# Patient Record
Sex: FEMALE | Race: WHITE | Employment: OTHER | URBAN - METROPOLITAN AREA
[De-identification: names, ages, dates, MRNs, and addresses within clinical notes are randomized per-mention and may not be internally consistent; named-entity substitution may affect disease eponyms.]

---

## 2018-08-16 ENCOUNTER — HOSPITAL ENCOUNTER (OUTPATIENT)
Dept: MAMMOGRAPHY | Age: 63
Discharge: HOME | End: 2018-08-16
Attending: INTERNAL MEDICINE | Admitting: INTERNAL MEDICINE

## 2018-08-16 DIAGNOSIS — Z12.39 BREAST CANCER SCREENING: ICD-10-CM

## 2020-09-30 ENCOUNTER — HOSPITAL ENCOUNTER (OUTPATIENT)
Dept: WOMENS IMAGING | Age: 65
Discharge: HOME | End: 2020-09-30
Payer: OTHER GOVERNMENT

## 2020-09-30 PROCEDURE — 77067 SCR MAMMO BI INCL CAD: CPT

## 2021-07-11 ENCOUNTER — APPOINTMENT (OUTPATIENT)
Dept: CT IMAGING | Age: 66
End: 2021-07-11
Payer: OTHER GOVERNMENT

## 2021-07-11 ENCOUNTER — HOSPITAL ENCOUNTER (EMERGENCY)
Age: 66
Discharge: HOME | End: 2021-07-11
Payer: OTHER GOVERNMENT

## 2021-07-11 ENCOUNTER — APPOINTMENT (OUTPATIENT)
Dept: GENERAL RADIOLOGY | Age: 66
End: 2021-07-11
Payer: OTHER GOVERNMENT

## 2021-07-11 VITALS
TEMPERATURE: 97 F | HEIGHT: 66 IN | HEART RATE: 100 BPM | OXYGEN SATURATION: 94 % | DIASTOLIC BLOOD PRESSURE: 82 MMHG | RESPIRATION RATE: 16 BRPM | SYSTOLIC BLOOD PRESSURE: 181 MMHG | BODY MASS INDEX: 45.8 KG/M2 | WEIGHT: 285 LBS
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
## 2021-07-11 DIAGNOSIS — S09.90XA INJURY OF HEAD, INITIAL ENCOUNTER: ICD-10-CM

## 2021-07-11 DIAGNOSIS — S01.81XA FACIAL LACERATION, INITIAL ENCOUNTER: ICD-10-CM

## 2021-07-11 DIAGNOSIS — M25.512 ACUTE PAIN OF LEFT SHOULDER: ICD-10-CM

## 2021-07-11 PROCEDURE — 73030 X-RAY EXAM OF SHOULDER: CPT

## 2021-07-11 PROCEDURE — 72125 CT NECK SPINE W/O DYE: CPT

## 2021-07-11 PROCEDURE — 12011 RPR F/E/E/N/L/M 2.5 CM/<: CPT

## 2021-07-11 PROCEDURE — 70450 CT HEAD/BRAIN W/O DYE: CPT

## 2021-07-11 PROCEDURE — 6370000000 HC RX 637 (ALT 250 FOR IP): Performed by: PHYSICIAN ASSISTANT

## 2021-07-11 PROCEDURE — 99284 EMERGENCY DEPT VISIT MOD MDM: CPT

## 2021-07-11 RX ORDER — ACETAMINOPHEN 500 MG
1000 TABLET ORAL ONCE
Status: COMPLETED | OUTPATIENT
Start: 2021-07-11 | End: 2021-07-11

## 2021-07-11 RX ORDER — B-COMPLEX WITH VITAMIN C
TABLET ORAL
COMMUNITY
Start: 2021-07-01

## 2021-07-11 RX ORDER — MELOXICAM 15 MG/1
TABLET ORAL
COMMUNITY
Start: 2021-07-01

## 2021-07-11 RX ORDER — LIDOCAINE 4 G/G
1 PATCH TOPICAL ONCE
Status: DISCONTINUED | OUTPATIENT
Start: 2021-07-11 | End: 2021-07-11 | Stop reason: HOSPADM

## 2021-07-11 RX ORDER — LIDOCAINE 50 MG/G
PATCH TOPICAL
COMMUNITY
Start: 2021-01-15

## 2021-07-11 RX ORDER — ATORVASTATIN CALCIUM 80 MG/1
TABLET, FILM COATED ORAL
COMMUNITY
Start: 2021-07-01

## 2021-07-11 RX ADMIN — ACETAMINOPHEN 1000 MG: 500 TABLET ORAL at 11:44

## 2021-07-11 ASSESSMENT — PAIN SCALES - GENERAL
PAINLEVEL_OUTOF10: 10
PAINLEVEL_OUTOF10: 10

## 2021-07-11 ASSESSMENT — PAIN DESCRIPTION - LOCATION: LOCATION: SHOULDER

## 2021-07-11 NOTE — ED NOTES
Pt ambulated short distance in ER to bathroom; gait steady, pt denies dizziness/ lightheadedness. Pt returned to room, remains with bed in low/locked position, side rails up x 2, call light in reach, family member bedside.      Kyrie Montgomery RN  07/11/21 2338

## 2021-07-11 NOTE — ED PROVIDER NOTES
[facility]        Pt Name: Zeeshan Lange  MRN: [id number]  [place] 1955  Date of evaluation: 7/11/2021  Provider: Reyes Ness PA-C  PCP: Inocencia Paul  Note Started: 11:52 AM EDT       WAQAS. I have evaluated this patient. My supervising physician was available for consultation. CHIEF COMPLAINT       Chief Complaint   Patient presents with    Fall     pt in by [facility] from [place]- states fall into pillar, no loc, denies dizziness- with lac to upper lip and pain to left shoulder, left neck. HISTORY OF PRESENT ILLNESS   (Location, Timing/Onset, Context/Setting, Quality, Duration, Modifying Factors, Severity, Associated Signs and Symptoms)  Note limiting factors. Chief Complaint: Fall, head injury and left shoulder pain    Zeeshan Lange is a 77 y.o. female who presents to the emergency department after she fell about an hour before presenting to the emergency department. She states she was walking at [employer] and then she states it felt like her legs just were not coming underneath her and she started stumbling forward falling forward hitting her head and her left shoulder. Has history of rotator cuff tear in her left shoulder and is actually scheduled for surgery at the [place] for this. She denies any other injury from the incident today. She denies loss of consciousness, dizziness, use of anticoagulants, nausea, vomiting, seizures, chest pain, shortness breath, abdominal pain, midline neck or back pain. Has some chronic knee pain and denies any change in this. Rates the pain a 10 out of 10. Does not want anything stronger than Tylenol at this time. Denies any other symptoms. Nursing Notes were all reviewed and agreed with or any disagreements were addressed in the HPI.     REVIEW OF SYSTEMS    (2-9 systems for level 4, 10 or more for level 5)     Review of Systems    Positives and Pertinent negatives as per HPI. Except as noted above in the ROS, all other systems were reviewed and negative. PAST MEDICAL HISTORY     Past Medical History:   Diagnosis Date    Arthritis     Hypertension          SURGICAL HISTORY     Past Surgical History:   Procedure Laterality Date    ACHILLES TENDON SURGERY      JOINT REPLACEMENT      bilateral partial knee         CURRENTMEDICATIONS       Discharge Medication List as of 7/11/2021  1:58 PM      CONTINUE these medications which have NOT CHANGED    Details   Calcium Carbonate-Vitamin D (OYSTER SHELL CALCIUM/D) 500-200 MG-UNIT TABS TAKE 1 TABLET BY MOUTH TWICE A DAYHistorical Med      metFORMIN (GLUCOPHAGE) 500 MG tablet TAKE ONE TABLET BY MOUTH TWICE A DAY WITH FOOD FOR DIABETESHistorical Med      meloxicam (MOBIC) 15 MG tablet TAKE ONE TABLET BY MOUTH ONCE DAILY WITH FOOD FOR PAIN/INFLAMMATIONHistorical Med      lidocaine (LIDODERM) 5 % APPLY 1 PATCH TO SKIN ONCE DAILY AS NEEDED MAY LEAVE ON UP TO 12 HOURS PER DAY (PRESS FIRMLY FOR 10-15 SECONDS FOR BEST ADHERENCE)Historical Med      vitamin D (CHOLECALCIFEROL) 25 MCG (1000 UT) TABS tablet TAKE TWO TABLETS BY MOUTH ONCE DAILY (VITAMIN D SUPPLEMENT)Historical Med      atorvastatin (LIPITOR) 80 MG tablet TAKE ONE TABLET BY MOUTH AT BEDTIME FOR CHOLESTEROLHistorical Med      ibuprofen-famotidine (DUEXIS) 800-26.6 MG TABS Take 1 tablet by mouth 2 times daily as neededHistorical Med      lisinopril-hydrochlorothiazide (PRINZIDE;ZESTORETIC) 20-25 MG per tablet Take 1 tablet by mouth dailyHistorical Med      naproxen (NAPROSYN) 500 MG tablet Take 1 tablet by mouth 2 times daily for 20 doses, Disp-20 tablet, R-0Print               ALLERGIES     Patient has no known allergies. FAMILYHISTORY     History reviewed. No pertinent family history.        SOCIAL HISTORY       Social History     Tobacco Use    Smoking status: Never Smoker   Substance Use Topics    Alcohol use: No    Drug use: No       SCREENINGS PHYSICAL EXAM    (up to 7 for level 4, 8 or more for level 5)     ED Triage Vitals [07/11/21 1102]   BP Temp Temp Source Pulse Resp SpO2 Height Weight   (!) 169/84 97 °F (36.1 °C) Infrared 100 16 93 % 5' 6\" (1.676 m) 285 lb (129.3 kg)       Physical Exam  Vitals and nursing note reviewed. Constitutional:       Appearance: She is well-developed. She is not diaphoretic. HENT:      Head:      Comments: There is a 1.6 cm laceration just below the nose over the philtrum. At no tenderness over the mandible or orbits. No entrapment of the eyes. No raccoon eyes or hernandez sign. No CSF rhinorrhea or septal hematoma. Right Ear: External ear normal.      Left Ear: External ear normal.      Nose: Nose normal.      Mouth/Throat:      Mouth: Mucous membranes are moist.   Eyes:      General:         Right eye: No discharge. Left eye: No discharge. Extraocular Movements: Extraocular movements intact. Pupils: Pupils are equal, round, and reactive to light. Cardiovascular:      Rate and Rhythm: Normal rate and regular rhythm. Pulses: Normal pulses. Heart sounds: No murmur heard. No friction rub. No gallop. Comments: 2+ radial pulse in left wrist.  Pulmonary:      Effort: Pulmonary effort is normal. No respiratory distress. Breath sounds: No stridor. No wheezing, rhonchi or rales. Abdominal:      General: Bowel sounds are normal. There is no distension. Palpations: Abdomen is soft. There is no mass. Tenderness: There is no abdominal tenderness. There is no guarding or rebound. Hernia: No hernia is present. Musculoskeletal:         General: Tenderness present. No swelling. Normal range of motion. Cervical back: Normal range of motion. Comments: Generalized tenderness to palpate over the left shoulder without any obvious deformity. Full range of motion of flexion extension of left elbow and wrist.  No joint warmth, erythema or rash.   No midline tenderness or step-off in the neck or back. Skin:     General: Skin is warm and dry. Findings: No erythema or rash. Neurological:      Mental Status: She is alert and oriented to person, place, and time. Cranial Nerves: No cranial nerve deficit. Psychiatric:         Behavior: Behavior normal.         DIAGNOSTIC RESULTS   LABS:    Labs Reviewed - No data to display    When ordered only abnormal lab results are displayed. All other labs were within normal range or not returned as of this dictation. EKG: When ordered, EKG's are interpreted by the Emergency Department Physician in the absence of a cardiologist.  Please see their note for interpretation of EKG. RADIOLOGY:   Non-plain film images such as CT, Ultrasound and MRI are read by the radiologist. Plain radiographic images are visualized and preliminarily interpreted by the ED Provider with the below findings:        Interpretation per the Radiologist below, if available at the time of this note:    XR SHOULDER LEFT (MIN 2 VIEWS)   Preliminary Result   No evidence of acute fracture. Follow-up examination recommended in 7-10 days   if clinically indicated. CT Cervical Spine WO Contrast   Final Result   No acute abnormality of the cervical spine. CT Head WO Contrast   Final Result   No acute intracranial abnormality. PROCEDURES   Unless otherwise noted below, none     Lac Repair  Performed by: Kaitlyn Adler PA-C  Authorized by: Kaitlyn Adler PA-C     Consent:     Consent obtained:  Verbal    Consent given by:  Patient    Risks discussed:  Infection, pain, poor cosmetic result, poor wound healing and need for additional repair  Anesthesia (see MAR for exact dosages): Anesthesia method:  Local infiltration    Local anesthetic:  Lidocaine 1% WITH epi  Laceration details:     Location:  Face    Facial location: philtrum.     Length (cm):  1.6  Repair type:     Repair type:  Simple  Pre-procedure details: Preparation:  Patient was prepped and draped in usual sterile fashion  Exploration:     Wound exploration: entire depth of wound probed and visualized    Treatment:     Area cleansed with:  Saline    Amount of cleaning:  Standard    Irrigation solution:  Sterile saline  Skin repair:     Repair method:  Sutures    Suture size:  6-0    Suture material:  Prolene    Suture technique:  Simple interrupted    Number of sutures:  4  Approximation:     Approximation:  Close  Post-procedure details:     Patient tolerance of procedure: Tolerated well, no immediate complications        CRITICAL CARE TIME   N/A    CONSULTS:  None      EMERGENCY DEPARTMENT COURSE and DIFFERENTIAL DIAGNOSIS/MDM:   Vitals:    Vitals:    07/11/21 1102 07/11/21 1130 07/11/21 1145 07/11/21 1200   BP: (!) 169/84 (!) 158/69 (!) 173/76 (!) 181/82   Pulse: 100      Resp: 16      Temp: 97 °F (36.1 °C)      TempSrc: Infrared      SpO2: 93% 94% 94% 94%   Weight: 285 lb (129.3 kg)      Height: 5' 6\" (1.676 m)          Patient was given the following medications:  Medications   lidocaine 4 % external patch 1 patch (1 patch Transdermal Patch Applied 7/11/21 1144)   lidocaine-EPINEPHrine 1 percent-1:[id number] injection (has no administration in time range)   acetaminophen (TYLENOL) tablet 1,000 mg (1,000 mg Oral Given 7/11/21 1144)           Patient presented after a fall. She has been able to ambulate here. States she just lost her balance falling forward. Mechanical fall. CT imaging is unremarkable. X-ray imaging of her left shoulder unremarkable. She already has follow-up with orthopedics at the [facility] for a rotator cuff tear in her left shoulder. She is distally neurovascular intact. Laceration of her face was repaired with 4 simple sutures was educated to have this removed in 1 week. Low suspicion for CVA, TIA, skull fracture, subdural hematoma, epidural hematoma, vertebral fracture or other emergent etiology.   Patient understood instructions at

## 2021-07-11 NOTE — ED NOTES
Bed: 21  Expected date:   Expected time:   Means of arrival:   Comments:  Medic 90-fall     Pierre Burnett RN  07/11/21 7493

## 2022-06-27 ENCOUNTER — HOSPITAL ENCOUNTER (OUTPATIENT)
Dept: WOMENS IMAGING | Age: 67
Discharge: HOME | End: 2022-06-27
Payer: OTHER GOVERNMENT

## 2022-06-27 VITALS — WEIGHT: 273 LBS | HEIGHT: 67 IN | BODY MASS INDEX: 42.85 KG/M2

## 2022-06-27 DIAGNOSIS — Z12.31 VISIT FOR SCREENING MAMMOGRAM: ICD-10-CM

## 2022-06-27 PROCEDURE — 77067 SCR MAMMO BI INCL CAD: CPT

## 2023-03-01 ENCOUNTER — HOSPITAL ENCOUNTER (INPATIENT)
Age: 68
LOS: 2 days | Discharge: HOME | End: 2023-03-03
Attending: EMERGENCY MEDICINE | Admitting: INTERNAL MEDICINE
Payer: OTHER GOVERNMENT

## 2023-03-01 ENCOUNTER — APPOINTMENT (OUTPATIENT)
Dept: GENERAL RADIOLOGY | Age: 68
End: 2023-03-01
Payer: OTHER GOVERNMENT

## 2023-03-01 DIAGNOSIS — L02.212 BACK ABSCESS: Primary | ICD-10-CM

## 2023-03-01 DIAGNOSIS — L03.312 CELLULITIS OF BACK: ICD-10-CM

## 2023-03-01 DIAGNOSIS — I48.91 NEW ONSET ATRIAL FIBRILLATION (HCC): ICD-10-CM

## 2023-03-01 DIAGNOSIS — R55 NEAR SYNCOPE: ICD-10-CM

## 2023-03-01 PROBLEM — I95.81 HYPOTENSION AFTER PROCEDURE: Status: ACTIVE | Noted: 2023-03-01

## 2023-03-01 PROBLEM — E78.2 MIXED HYPERLIPIDEMIA: Status: ACTIVE | Noted: 2023-03-01

## 2023-03-01 PROBLEM — E66.01 MORBID OBESITY DUE TO EXCESS CALORIES (HCC): Status: ACTIVE | Noted: 2023-03-01

## 2023-03-01 PROBLEM — D72.825 BANDEMIA: Status: ACTIVE | Noted: 2023-03-01

## 2023-03-01 LAB
A/G RATIO: 1.1 (ref 1.1–2.2)
ALBUMIN SERPL-MCNC: 3.8 G/DL (ref 3.4–5)
ALP BLD-CCNC: 98 U/L (ref 40–129)
ALT SERPL-CCNC: 10 U/L (ref 10–40)
ANION GAP SERPL CALCULATED.3IONS-SCNC: 11 MMOL/L (ref 3–16)
APTT: 26.5 SEC (ref 23–34.3)
AST SERPL-CCNC: 13 U/L (ref 15–37)
BASOPHILS ABSOLUTE: 0.1 K/UL (ref 0–0.2)
BASOPHILS RELATIVE PERCENT: 0.5 %
BILIRUB SERPL-MCNC: 0.6 MG/DL (ref 0–1)
BUN BLDV-MCNC: 30 MG/DL (ref 7–20)
C-REACTIVE PROTEIN: 31.2 MG/L (ref 0–5.1)
CALCIUM SERPL-MCNC: 10 MG/DL (ref 8.3–10.6)
CHLORIDE BLD-SCNC: 103 MMOL/L (ref 99–110)
CO2: 25 MMOL/L (ref 21–32)
CREAT SERPL-MCNC: 1 MG/DL (ref 0.6–1.2)
EKG ATRIAL RATE: 88 BPM
EKG DIAGNOSIS: NORMAL
EKG Q-T INTERVAL: 370 MS
EKG QRS DURATION: 80 MS
EKG QTC CALCULATION (BAZETT): 421 MS
EKG R AXIS: -9 DEGREES
EKG T AXIS: 28 DEGREES
EKG VENTRICULAR RATE: 78 BPM
EOSINOPHILS ABSOLUTE: 0.1 K/UL (ref 0–0.6)
EOSINOPHILS RELATIVE PERCENT: 1 %
FERRITIN: 170.5 NG/ML (ref 15–150)
GFR SERPL CREATININE-BSD FRML MDRD: >60 ML/MIN/{1.73_M2}
GLUCOSE BLD-MCNC: 114 MG/DL (ref 70–99)
GLUCOSE BLD-MCNC: 127 MG/DL (ref 70–99)
GLUCOSE BLD-MCNC: 159 MG/DL (ref 70–99)
GLUCOSE BLD-MCNC: 164 MG/DL (ref 70–99)
GLUCOSE BLD-MCNC: 169 MG/DL (ref 70–99)
GLUCOSE BLD-MCNC: 169 MG/DL (ref 70–99)
HCT VFR BLD CALC: 35.4 % (ref 36–48)
HEMOGLOBIN: 11.6 G/DL (ref 12–16)
INR BLD: 1.06 (ref 0.87–1.14)
IRON SATURATION: 10 % (ref 15–50)
IRON: 23 UG/DL (ref 37–145)
LYMPHOCYTES ABSOLUTE: 2 K/UL (ref 1–5.1)
LYMPHOCYTES RELATIVE PERCENT: 16.3 %
MCH RBC QN AUTO: 28.8 PG (ref 26–34)
MCHC RBC AUTO-ENTMCNC: 32.9 G/DL (ref 31–36)
MCV RBC AUTO: 87.5 FL (ref 80–100)
MONOCYTES ABSOLUTE: 0.8 K/UL (ref 0–1.3)
MONOCYTES RELATIVE PERCENT: 6.6 %
NEUTROPHILS ABSOLUTE: 9.4 K/UL (ref 1.7–7.7)
NEUTROPHILS RELATIVE PERCENT: 75.6 %
PDW BLD-RTO: 15.7 % (ref 12.4–15.4)
PERFORMED ON: ABNORMAL
PLATELET # BLD: 262 K/UL (ref 135–450)
PMV BLD AUTO: 10 FL (ref 5–10.5)
POTASSIUM REFLEX MAGNESIUM: 4.5 MMOL/L (ref 3.5–5.1)
PRO-BNP: 330 PG/ML (ref 0–124)
PROTHROMBIN TIME: 13.7 SEC (ref 11.7–14.5)
RBC # BLD: 4.04 M/UL (ref 4–5.2)
SEDIMENTATION RATE, ERYTHROCYTE: 53 MM/HR (ref 0–30)
SODIUM BLD-SCNC: 139 MMOL/L (ref 136–145)
TOTAL IRON BINDING CAPACITY: 238 UG/DL (ref 260–445)
TOTAL PROTEIN: 7.3 G/DL (ref 6.4–8.2)
TROPONIN: <0.01 NG/ML
WBC # BLD: 12.5 K/UL (ref 4–11)

## 2023-03-01 PROCEDURE — APPNB30 APP NON BILLABLE TIME 0-30 MINS: Performed by: NURSE PRACTITIONER

## 2023-03-01 PROCEDURE — 6360000002 HC RX W HCPCS: Performed by: INTERNAL MEDICINE

## 2023-03-01 PROCEDURE — 87081 CULTURE SCREEN ONLY: CPT

## 2023-03-01 PROCEDURE — 96374 THER/PROPH/DIAG INJ IV PUSH: CPT

## 2023-03-01 PROCEDURE — 85730 THROMBOPLASTIN TIME PARTIAL: CPT

## 2023-03-01 PROCEDURE — 1200000000 HC SEMI PRIVATE

## 2023-03-01 PROCEDURE — 6370000000 HC RX 637 (ALT 250 FOR IP): Performed by: INTERNAL MEDICINE

## 2023-03-01 PROCEDURE — 80053 COMPREHEN METABOLIC PANEL: CPT

## 2023-03-01 PROCEDURE — 2580000003 HC RX 258: Performed by: INTERNAL MEDICINE

## 2023-03-01 PROCEDURE — 84484 ASSAY OF TROPONIN QUANT: CPT

## 2023-03-01 PROCEDURE — 71045 X-RAY EXAM CHEST 1 VIEW: CPT

## 2023-03-01 PROCEDURE — 0J970ZZ DRAINAGE OF BACK SUBCUTANEOUS TISSUE AND FASCIA, OPEN APPROACH: ICD-10-PCS | Performed by: SURGERY

## 2023-03-01 PROCEDURE — 82728 ASSAY OF FERRITIN: CPT

## 2023-03-01 PROCEDURE — 83880 ASSAY OF NATRIURETIC PEPTIDE: CPT

## 2023-03-01 PROCEDURE — 83550 IRON BINDING TEST: CPT

## 2023-03-01 PROCEDURE — 2580000003 HC RX 258: Performed by: EMERGENCY MEDICINE

## 2023-03-01 PROCEDURE — 0H96XZZ DRAINAGE OF BACK SKIN, EXTERNAL APPROACH: ICD-10-PCS | Performed by: SURGERY

## 2023-03-01 PROCEDURE — 6360000002 HC RX W HCPCS: Performed by: EMERGENCY MEDICINE

## 2023-03-01 PROCEDURE — 99285 EMERGENCY DEPT VISIT HI MDM: CPT

## 2023-03-01 PROCEDURE — 85652 RBC SED RATE AUTOMATED: CPT

## 2023-03-01 PROCEDURE — 10060 I&D ABSCESS SIMPLE/SINGLE: CPT

## 2023-03-01 PROCEDURE — 83036 HEMOGLOBIN GLYCOSYLATED A1C: CPT

## 2023-03-01 PROCEDURE — 85025 COMPLETE CBC W/AUTO DIFF WBC: CPT

## 2023-03-01 PROCEDURE — 93005 ELECTROCARDIOGRAM TRACING: CPT | Performed by: EMERGENCY MEDICINE

## 2023-03-01 PROCEDURE — 6370000000 HC RX 637 (ALT 250 FOR IP): Performed by: EMERGENCY MEDICINE

## 2023-03-01 PROCEDURE — 2500000003 HC RX 250 WO HCPCS: Performed by: SURGERY

## 2023-03-01 PROCEDURE — 36415 COLL VENOUS BLD VENIPUNCTURE: CPT

## 2023-03-01 PROCEDURE — 87040 BLOOD CULTURE FOR BACTERIA: CPT

## 2023-03-01 PROCEDURE — 86140 C-REACTIVE PROTEIN: CPT

## 2023-03-01 PROCEDURE — 85610 PROTHROMBIN TIME: CPT

## 2023-03-01 PROCEDURE — 83540 ASSAY OF IRON: CPT

## 2023-03-01 RX ORDER — SULFAMETHOXAZOLE AND TRIMETHOPRIM 800; 160 MG/1; MG/1
1 TABLET ORAL ONCE
Status: COMPLETED | OUTPATIENT
Start: 2023-03-01 | End: 2023-03-01

## 2023-03-01 RX ORDER — ATORVASTATIN CALCIUM 40 MG/1
40 TABLET, FILM COATED ORAL NIGHTLY
Status: DISCONTINUED | OUTPATIENT
Start: 2023-03-01 | End: 2023-03-03 | Stop reason: HOSPADM

## 2023-03-01 RX ORDER — HYDROCODONE BITARTRATE AND ACETAMINOPHEN 5; 325 MG/1; MG/1
1 TABLET ORAL EVERY 6 HOURS PRN
Qty: 16 TABLET | Refills: 0 | Status: SHIPPED | OUTPATIENT
Start: 2023-03-01 | End: 2023-03-05

## 2023-03-01 RX ORDER — ONDANSETRON 2 MG/ML
4 INJECTION INTRAMUSCULAR; INTRAVENOUS EVERY 6 HOURS PRN
Status: DISCONTINUED | OUTPATIENT
Start: 2023-03-01 | End: 2023-03-03 | Stop reason: HOSPADM

## 2023-03-01 RX ORDER — LIDOCAINE HYDROCHLORIDE AND EPINEPHRINE 10; 10 MG/ML; UG/ML
20 INJECTION, SOLUTION INFILTRATION; PERINEURAL ONCE
Status: DISCONTINUED | OUTPATIENT
Start: 2023-03-01 | End: 2023-03-01 | Stop reason: CLARIF

## 2023-03-01 RX ORDER — ONDANSETRON 4 MG/1
4 TABLET, ORALLY DISINTEGRATING ORAL EVERY 8 HOURS PRN
Status: DISCONTINUED | OUTPATIENT
Start: 2023-03-01 | End: 2023-03-03 | Stop reason: HOSPADM

## 2023-03-01 RX ORDER — LISINOPRIL 20 MG/1
20 TABLET ORAL DAILY
Status: DISCONTINUED | OUTPATIENT
Start: 2023-03-01 | End: 2023-03-03 | Stop reason: HOSPADM

## 2023-03-01 RX ORDER — SODIUM CHLORIDE 0.9 % (FLUSH) 0.9 %
5-40 SYRINGE (ML) INJECTION PRN
Status: DISCONTINUED | OUTPATIENT
Start: 2023-03-01 | End: 2023-03-03 | Stop reason: HOSPADM

## 2023-03-01 RX ORDER — LISINOPRIL AND HYDROCHLOROTHIAZIDE 25; 20 MG/1; MG/1
1 TABLET ORAL DAILY
Status: DISCONTINUED | OUTPATIENT
Start: 2023-03-01 | End: 2023-03-01 | Stop reason: RX

## 2023-03-01 RX ORDER — SODIUM CHLORIDE 9 MG/ML
INJECTION, SOLUTION INTRAVENOUS PRN
Status: DISCONTINUED | OUTPATIENT
Start: 2023-03-01 | End: 2023-03-03 | Stop reason: HOSPADM

## 2023-03-01 RX ORDER — MELOXICAM 7.5 MG/1
15 TABLET ORAL DAILY
Status: DISCONTINUED | OUTPATIENT
Start: 2023-03-01 | End: 2023-03-01

## 2023-03-01 RX ORDER — LIDOCAINE HYDROCHLORIDE AND EPINEPHRINE BITARTRATE 20; .01 MG/ML; MG/ML
20 INJECTION, SOLUTION SUBCUTANEOUS ONCE
Status: DISCONTINUED | OUTPATIENT
Start: 2023-03-01 | End: 2023-03-01 | Stop reason: SDUPTHER

## 2023-03-01 RX ORDER — HYDROCHLOROTHIAZIDE 25 MG/1
25 TABLET ORAL DAILY
Status: DISCONTINUED | OUTPATIENT
Start: 2023-03-01 | End: 2023-03-02

## 2023-03-01 RX ORDER — INSULIN LISPRO 100 [IU]/ML
0-4 INJECTION, SOLUTION INTRAVENOUS; SUBCUTANEOUS NIGHTLY
Status: DISCONTINUED | OUTPATIENT
Start: 2023-03-01 | End: 2023-03-03 | Stop reason: HOSPADM

## 2023-03-01 RX ORDER — SODIUM CHLORIDE 9 MG/ML
INJECTION, SOLUTION INTRAVENOUS CONTINUOUS
Status: DISCONTINUED | OUTPATIENT
Start: 2023-03-01 | End: 2023-03-01 | Stop reason: ALTCHOICE

## 2023-03-01 RX ORDER — ENOXAPARIN SODIUM 100 MG/ML
30 INJECTION SUBCUTANEOUS 2 TIMES DAILY
Status: DISCONTINUED | OUTPATIENT
Start: 2023-03-01 | End: 2023-03-01

## 2023-03-01 RX ORDER — 0.9 % SODIUM CHLORIDE 0.9 %
1000 INTRAVENOUS SOLUTION INTRAVENOUS ONCE
Status: COMPLETED | OUTPATIENT
Start: 2023-03-01 | End: 2023-03-01

## 2023-03-01 RX ORDER — LIDOCAINE HYDROCHLORIDE AND EPINEPHRINE BITARTRATE 20; .01 MG/ML; MG/ML
20 INJECTION, SOLUTION SUBCUTANEOUS ONCE
Status: COMPLETED | OUTPATIENT
Start: 2023-03-01 | End: 2023-03-01

## 2023-03-01 RX ORDER — CEPHALEXIN 500 MG/1
500 CAPSULE ORAL 4 TIMES DAILY
Qty: 40 CAPSULE | Refills: 0 | Status: SHIPPED
Start: 2023-03-01 | End: 2023-03-03 | Stop reason: HOSPADM

## 2023-03-01 RX ORDER — ACETAMINOPHEN 650 MG/1
650 SUPPOSITORY RECTAL EVERY 6 HOURS PRN
Status: DISCONTINUED | OUTPATIENT
Start: 2023-03-01 | End: 2023-03-03 | Stop reason: HOSPADM

## 2023-03-01 RX ORDER — CEPHALEXIN 250 MG/1
500 CAPSULE ORAL ONCE
Status: COMPLETED | OUTPATIENT
Start: 2023-03-01 | End: 2023-03-01

## 2023-03-01 RX ORDER — LIDOCAINE HYDROCHLORIDE AND EPINEPHRINE BITARTRATE 20; .01 MG/ML; MG/ML
INJECTION, SOLUTION SUBCUTANEOUS
Status: DISPENSED
Start: 2023-03-01 | End: 2023-03-01

## 2023-03-01 RX ORDER — POLYETHYLENE GLYCOL 3350 17 G/17G
17 POWDER, FOR SOLUTION ORAL DAILY PRN
Status: DISCONTINUED | OUTPATIENT
Start: 2023-03-01 | End: 2023-03-03 | Stop reason: HOSPADM

## 2023-03-01 RX ORDER — ACETAMINOPHEN 500 MG
1000 TABLET ORAL EVERY 6 HOURS PRN
COMMUNITY

## 2023-03-01 RX ORDER — ONDANSETRON 2 MG/ML
4 INJECTION INTRAMUSCULAR; INTRAVENOUS
Status: DISCONTINUED | OUTPATIENT
Start: 2023-03-01 | End: 2023-03-01 | Stop reason: HOSPADM

## 2023-03-01 RX ORDER — LINEZOLID 600 MG/1
600 TABLET, FILM COATED ORAL EVERY 12 HOURS SCHEDULED
Status: DISCONTINUED | OUTPATIENT
Start: 2023-03-01 | End: 2023-03-03 | Stop reason: HOSPADM

## 2023-03-01 RX ORDER — DEXTROSE MONOHYDRATE 100 MG/ML
INJECTION, SOLUTION INTRAVENOUS CONTINUOUS PRN
Status: DISCONTINUED | OUTPATIENT
Start: 2023-03-01 | End: 2023-03-03 | Stop reason: HOSPADM

## 2023-03-01 RX ORDER — SODIUM CHLORIDE 0.9 % (FLUSH) 0.9 %
5-40 SYRINGE (ML) INJECTION EVERY 12 HOURS SCHEDULED
Status: DISCONTINUED | OUTPATIENT
Start: 2023-03-01 | End: 2023-03-03 | Stop reason: HOSPADM

## 2023-03-01 RX ORDER — HYDROCODONE BITARTRATE AND ACETAMINOPHEN 5; 325 MG/1; MG/1
2 TABLET ORAL ONCE
Status: COMPLETED | OUTPATIENT
Start: 2023-03-01 | End: 2023-03-01

## 2023-03-01 RX ORDER — ACETAMINOPHEN 325 MG/1
650 TABLET ORAL EVERY 6 HOURS PRN
Status: DISCONTINUED | OUTPATIENT
Start: 2023-03-01 | End: 2023-03-03 | Stop reason: HOSPADM

## 2023-03-01 RX ORDER — INSULIN LISPRO 100 [IU]/ML
0-4 INJECTION, SOLUTION INTRAVENOUS; SUBCUTANEOUS
Status: DISCONTINUED | OUTPATIENT
Start: 2023-03-01 | End: 2023-03-03 | Stop reason: HOSPADM

## 2023-03-01 RX ORDER — SULFAMETHOXAZOLE AND TRIMETHOPRIM 800; 160 MG/1; MG/1
1 TABLET ORAL 2 TIMES DAILY
Qty: 20 TABLET | Refills: 0 | Status: SHIPPED
Start: 2023-03-01 | End: 2023-03-03 | Stop reason: HOSPADM

## 2023-03-01 RX ADMIN — CEFAZOLIN 2000 MG: 2 INJECTION, POWDER, FOR SOLUTION INTRAMUSCULAR; INTRAVENOUS at 16:31

## 2023-03-01 RX ADMIN — LIDOCAINE HYDROCHLORIDE,EPINEPHRINE BITARTRATE 20 ML: 20; .01 INJECTION, SOLUTION INFILTRATION; PERINEURAL at 12:34

## 2023-03-01 RX ADMIN — CEFEPIME 2000 MG: 2 INJECTION, POWDER, FOR SOLUTION INTRAVENOUS at 21:26

## 2023-03-01 RX ADMIN — SULFAMETHOXAZOLE AND TRIMETHOPRIM 1 TABLET: 800; 160 TABLET ORAL at 05:21

## 2023-03-01 RX ADMIN — ONDANSETRON 4 MG: 2 INJECTION INTRAMUSCULAR; INTRAVENOUS at 04:47

## 2023-03-01 RX ADMIN — APIXABAN 5 MG: 5 TABLET, FILM COATED ORAL at 21:21

## 2023-03-01 RX ADMIN — Medication 10 ML: at 09:26

## 2023-03-01 RX ADMIN — CEFAZOLIN 2000 MG: 2 INJECTION, POWDER, FOR SOLUTION INTRAMUSCULAR; INTRAVENOUS at 09:07

## 2023-03-01 RX ADMIN — Medication 10 ML: at 21:21

## 2023-03-01 RX ADMIN — CEPHALEXIN 500 MG: 250 CAPSULE ORAL at 05:21

## 2023-03-01 RX ADMIN — ACETAMINOPHEN 650 MG: 325 TABLET ORAL at 09:50

## 2023-03-01 RX ADMIN — SODIUM CHLORIDE: 9 INJECTION, SOLUTION INTRAVENOUS at 07:15

## 2023-03-01 RX ADMIN — ATORVASTATIN CALCIUM 40 MG: 40 TABLET, FILM COATED ORAL at 21:21

## 2023-03-01 RX ADMIN — LINEZOLID 600 MG: 600 TABLET, FILM COATED ORAL at 21:21

## 2023-03-01 RX ADMIN — SODIUM CHLORIDE 1000 ML: 9 INJECTION, SOLUTION INTRAVENOUS at 04:48

## 2023-03-01 RX ADMIN — ENOXAPARIN SODIUM 30 MG: 100 INJECTION SUBCUTANEOUS at 09:12

## 2023-03-01 RX ADMIN — HYDROCODONE BITARTRATE AND ACETAMINOPHEN 2 TABLET: 5; 325 TABLET ORAL at 05:21

## 2023-03-01 RX ADMIN — SODIUM CHLORIDE: 9 INJECTION, SOLUTION INTRAVENOUS at 09:10

## 2023-03-01 RX ADMIN — ONDANSETRON 4 MG: 2 INJECTION INTRAMUSCULAR; INTRAVENOUS at 11:57

## 2023-03-01 ASSESSMENT — PAIN DESCRIPTION - PAIN TYPE
TYPE: ACUTE PAIN

## 2023-03-01 ASSESSMENT — ENCOUNTER SYMPTOMS
NAUSEA: 0
SORE THROAT: 0
DIARRHEA: 0
SINUS PAIN: 0
EYE DISCHARGE: 0
BACK PAIN: 1
SINUS PRESSURE: 0
COUGH: 0
CONSTIPATION: 0
SHORTNESS OF BREATH: 0
ABDOMINAL PAIN: 0
RHINORRHEA: 0
EYE REDNESS: 0
WHEEZING: 0

## 2023-03-01 ASSESSMENT — PAIN - FUNCTIONAL ASSESSMENT: PAIN_FUNCTIONAL_ASSESSMENT: 0-10

## 2023-03-01 ASSESSMENT — PAIN DESCRIPTION - ORIENTATION
ORIENTATION: MID

## 2023-03-01 ASSESSMENT — PAIN DESCRIPTION - LOCATION
LOCATION: BACK

## 2023-03-01 ASSESSMENT — PAIN SCALES - GENERAL
PAINLEVEL_OUTOF10: 0
PAINLEVEL_OUTOF10: 2
PAINLEVEL_OUTOF10: 10
PAINLEVEL_OUTOF10: 0
PAINLEVEL_OUTOF10: 4
PAINLEVEL_OUTOF10: 7

## 2023-03-01 ASSESSMENT — PAIN DESCRIPTION - DESCRIPTORS: DESCRIPTORS: DISCOMFORT

## 2023-03-01 NOTE — ED PROVIDER NOTES
[facility]) Emergency [address] [facility] MD Yeny, am the primary clinician of record. CHIEF COMPLAINT  Chief Complaint   Patient presents with    Abscess     Patient arrives with c/o red, raised, painful area to mid back that she noticed on Sunday. Area has since gotten larger and started to drain. HISTORY OF PRESENT ILLNESS  Verónica Farris is a 79 y.o. female  who presents to the ED complaining of initially noticing a pimple in the middle of the back on Sunday, with subsequent progressive spreading redness swelling and discomfort. Inferior aspect of the wound is now spontaneously draining thick yellowish malodorous drainage. She thinks it may have been from a spider bite but never sought actual insect. This is the first time she has been seen for it. She has had some nausea at some points but not a fever. Denies any lesions anywhere else or history of similar symptoms in the past.  She has not had any injury to the area. No other complaints, modifying factors or associated symptoms. I have reviewed the following from the nursing documentation. Past Medical History:   Diagnosis Date    Arthritis     Hypertension      Past Surgical History:   Procedure Laterality Date    ACHILLES TENDON SURGERY      JOINT REPLACEMENT      bilateral partial knee     History reviewed. No pertinent family history.   Social History     Socioeconomic History    Marital status:      Spouse name: Not on file    Number of children: Not on file    Years of education: Not on file    Highest education level: Not on file   Occupational History    Not on file   Tobacco Use    Smoking status: Never    Smokeless tobacco: Not on file   Substance and Sexual Activity    Alcohol use: No    Drug use: No    Sexual activity: Not on file   Other Topics Concern    Not on file   Social History Narrative    Not on file     Social Determinants of Health     Financial Resource Strain: Not on file Food Insecurity: Not on file   Transportation Needs: Not on file   Physical Activity: Not on file   Stress: Not on file   Social Connections: Not on file   Intimate Partner Violence: Not on file   Housing Stability: Not on file     Current Facility-Administered Medications   Medication Dose Route Frequency Provider Last Rate Last Admin    lidocaine-EPINEPHrine 2 percent-1:422484 injection 20 mL  20 mL IntraDERmal Once Ivon Gay MD        lidocaine-EPINEPHrine 2 percent-1:903988 injection             ondansetron (ZOFRAN) injection 4 mg  4 mg IntraVENous Q1H PRN Ivon Gay MD   4 mg at 03/01/23 0447     Current Outpatient Medications   Medication Sig Dispense Refill    sulfamethoxazole-trimethoprim (BACTRIM DS) 800-160 MG per tablet Take 1 tablet by mouth 2 times daily for 10 days 20 tablet 0    cephALEXin (KEFLEX) 500 MG capsule Take 1 capsule by mouth 4 times daily for 10 days 40 capsule 0    HYDROcodone-acetaminophen (NORCO) 5-325 MG per tablet Take 1 tablet by mouth every 6 hours as needed for Pain (CAUTION: Can cause dizziness, don't drive while taking.) for up to 4 days.  Max Daily Amount: 4 tablets 16 tablet 0    Calcium Carbonate-Vitamin D (OYSTER SHELL CALCIUM/D) 500-200 MG-UNIT TABS TAKE 1 TABLET BY MOUTH TWICE A DAY      metFORMIN (GLUCOPHAGE) 500 MG tablet TAKE ONE TABLET BY MOUTH TWICE A DAY WITH FOOD FOR DIABETES      meloxicam (MOBIC) 15 MG tablet TAKE ONE TABLET BY MOUTH ONCE DAILY WITH FOOD FOR PAIN/INFLAMMATION      lidocaine (LIDODERM) 5 % APPLY 1 PATCH TO SKIN ONCE DAILY AS NEEDED MAY LEAVE ON UP TO 12 HOURS PER DAY (PRESS FIRMLY FOR 10-15 SECONDS FOR BEST ADHERENCE)      vitamin D (CHOLECALCIFEROL) 25 MCG (1000 UT) TABS tablet TAKE TWO TABLETS BY MOUTH ONCE DAILY (VITAMIN D SUPPLEMENT)      atorvastatin (LIPITOR) 80 MG tablet TAKE ONE TABLET BY MOUTH AT BEDTIME FOR CHOLESTEROL      ibuprofen-famotidine (DUEXIS) 800-26.6 MG TABS Take 1 tablet by mouth 2 times daily as needed lisinopril-hydrochlorothiazide (PRINZIDE;ZESTORETIC) 20-25 MG per tablet Take 1 tablet by mouth daily      naproxen (NAPROSYN) 500 MG tablet Take 1 tablet by mouth 2 times daily for 20 doses 20 tablet 0     No Known Allergies    REVIEW OF SYSTEMS  6 systems reviewed, pertinent positives per HPI otherwise noted to be negative    PHYSICAL EXAM   /62   Pulse 88   Temp 98.1 °F (36.7 °C) (Oral)   Resp 19   Ht 5' 6\" (1.676 m)   Wt 274 lb (124.3 kg)   SpO2 96%   BMI 44.22 kg/m²    GENERAL APPEARANCE: Awake and alert. Cooperative. No acute distress. HEAD: Normocephalic. Atraumatic. EYES: PERRL. EOM's grossly intact. ENT: Mucous membranes are moist.   NECK: Supple. Normal ROM. CHEST: Equal symmetric chest rise. RRR  LUNGS: Breathing is unlabored. Speaking comfortably in full sentences. CTAB  ABDOMEN: Nondistended, nontender  EXTREMITIES: MAEE. No acute deformities. NEUROLOGICAL: Alert and oriented. Strength is 5/5 in all extremities and sensation is intact. SKIN: Warm and dry. Area as described below in the middle of the upper back with redness warmth tenderness central fluctuance and surrounding induration. The inferior aspect of the wound does have a small area where purulent drainage can be milked out from the superior aspect of the abscess. LABS  I have personally reviewed all labs for this visit.    Results for orders placed or performed during the hospital encounter of 03/01/23   CBC with Auto Differential   Result Value Ref Range    WBC 12.5 (H) 4.0 - 11.0 K/uL    RBC 4.04 4.00 - 5.20 M/uL    Hemoglobin 11.6 (L) 12.0 - 16.0 g/dL    Hematocrit 35.4 (L) 36.0 - 48.0 %    MCV 87.5 80.0 - 100.0 fL    MCH 28.8 26.0 - 34.0 pg    MCHC 32.9 31.0 - 36.0 g/dL    RDW 15.7 (H) 12.4 - 15.4 %    Platelets 504 069 - 305 K/uL    MPV 10.0 5.0 - 10.5 fL    Neutrophils % 75.6 %    Lymphocytes % 16.3 %    Monocytes % 6.6 %    Eosinophils % 1.0 %    Basophils % 0.5 %    Neutrophils Absolute 9.4 (H) 1.7 - 7.7 K/uL    Lymphocytes Absolute 2.0 1.0 - 5.1 K/uL    Monocytes Absolute 0.8 0.0 - 1.3 K/uL    Eosinophils Absolute 0.1 0.0 - 0.6 K/uL    Basophils Absolute 0.1 0.0 - 0.2 K/uL   Comprehensive Metabolic Panel w/ Reflex to MG   Result Value Ref Range    Sodium 139 136 - 145 mmol/L    Potassium reflex Magnesium 4.5 3.5 - 5.1 mmol/L    Chloride 103 99 - 110 mmol/L    CO2 25 21 - 32 mmol/L    Anion Gap 11 3 - 16    Glucose 169 (H) 70 - 99 mg/dL    BUN 30 (H) 7 - 20 mg/dL    Creatinine 1.0 0.6 - 1.2 mg/dL    Est, Glom Filt Rate >60 >60    Calcium 10.0 8.3 - 10.6 mg/dL    Total Protein 7.3 6.4 - 8.2 g/dL    Albumin 3.8 3.4 - 5.0 g/dL    Albumin/Globulin Ratio 1.1 1.1 - 2.2    Total Bilirubin 0.6 0.0 - 1.0 mg/dL    Alkaline Phosphatase 98 40 - 129 U/L    ALT 10 10 - 40 U/L    AST 13 (L) 15 - 37 U/L   Troponin   Result Value Ref Range    Troponin <0.01 <0.01 ng/mL   Brain Natriuretic Peptide   Result Value Ref Range    Pro- (H) 0 - 124 pg/mL   Protime-INR   Result Value Ref Range    Protime 13.7 11.7 - 14.5 sec    INR 1.06 0.87 - 1.14   APTT   Result Value Ref Range    aPTT 26.5 23.0 - 34.3 sec   POCT Glucose   Result Value Ref Range    POC Glucose 127 (H) 70 - 99 mg/dl    Performed on ACCU-CHEK    EKG 12 Lead   Result Value Ref Range    Ventricular Rate 78 BPM    Atrial Rate 88 BPM    QRS Duration 80 ms    Q-T Interval 370 ms    QTc Calculation (Bazett) 421 ms    R Axis -9 degrees    T Axis 28 degrees    Diagnosis       Atrial fibrillationLow voltage QRSInferior infarct , age undeterminedAbnormal ECG       EKG performed in ED:  The 12 lead EKG was interpreted by me independent of a cardiologist as follows:  Rate: normal with a rate of 78  Rhythm: atrial fibrillation  Axis: normal  Intervals: narrow QRS normal QTc  ST segments: no ST elevations or depressions  T waves: no abnormal inversions  Non-specific T wave changes: not present  Prior EKG comparison: No prior is currently available for comparison      RADIOLOGY  Any applicable radiology studies including x-ray, CT, MRI, and/or ultrasound, were reviewed independently by me in addition to the radiologist.  I reviewed all radiology images and reports as well from this evaluation. (My bedside ultrasound below)      ED COURSE/MDM  Patient seen and evaluated. Old records reviewed. Labs and imaging reviewed. After initial evaluation, differential diagnostic considerations included but not limited to: cellulitis, abscess, necrotizing fasciitis, ulceration    The patient's ED workup was notable for soft tissue infection of the upper back. Initial inciting event is unclear, could have been an insect bite as they suspect but regardless we will treat for staph and strep with Bactrim and Keflex combination therapy and was given the first dose here. The patient also given oral Norco with some pain relief here and will be prescribed this medication as well. I did a bedside ultrasound given that I was able to perform some spontaneous drainage by milking the lesion and wanted to determine need for I&D. Bedside ultrasound did confirm abscess pocket amenable to I&D and so I proceeded as per the procedure note below. Patient tolerated this with some slight lightheadedness during the procedure and so an Accu-Chek was performed and 127. She is feeling better on reassessment with tincture of time. Patient's follow-up is with PCP in 2 to 3 days for wound check and packing removal or return to the ED with new or worsening symptoms or if unable to logistically get her PCP appointment time. I did consider laboratory evaluation however the patient is overall well-appearing with an isolated abscess and cellulitis without other indicators of sepsis such as fever or tachycardia or tachypnea. After the procedure, as noted above the patient has some lightheadedness and actually was transiently hypotensive.   At that point I did ultimately decide to check labs and they showed leukocytosis at 12.5 but nothing else suggesting sepsis/shock at this point. I suspect the hypotension is NOT sepsis related as the patient does not have fever tachycardia or tachypnea or other signs of systemic illness and the transient hypotension could have been vasovagal or arrhythmia related. EKG showed atrial fibrillation rate controlled - was not ischemic - though pt has NO known history of atrial fibrillation. Patient was given IV fluids. On reassessment, she is starting to feel better and fluids are improving BP. Given IV zofran for her nausea with improvement. As these symptoms were temporally related to the procedure itself, I suspected a vasovagal type response rather than this representing a sign of severe sepsis or shock but the afib again is a new problem for the patient. The patient only received a total of 5cc of the 2% epi with lidocaine and so this is not likely enough medication to cause systemic effects though I considered that as well. Based on the above, the initial plan of discharge is changed to admission for hemodynamic monitoring and new onset afib workup, for which it is unclear if the reason of presentation (back abscess) has anything to do with this finding and change in condition after the procedure.       BEDSIDE ULTRASOUND  Soft Tissue Scan Procedure Note    Indication: Evaluate for abscess versus cellulitis    Acquisition:  Linear probe    Views:  Skin and subcutaneous tissue: Adequate                  Images obtained with findings:   Tissue thickening: Present  Cobblestoning: Present  Subcutaneous fluid collection: Present  Foreign body: Absent     Impression:   Sonographic evidence of cellulitis: Present  Sonographic evidence of abscess: Present  Foreign body: Absent     Abscess identified, proceed with I&D    Images obtained by Saul John MD, interpreted by Saul John MD.      Incision and Drainage Procedure Note    Indication: skin abscess of the upper back    Consent: The patient was counseled regarding the procedure, it's indications, risks, potential complications and alternatives and any questions were answered. Consent was obtained. Procedure: The patient was positioned appropriately and the skin over the incision site was prepped with betadine. Local anesthesia was obtained by infiltration using 2% Lidocaine with epinephrine. Using a scalpel, an incision was then made over the greatest area of fluctuance and approximately 5 cc of thick, foul smelling, and purulent material was expressed. Loculations were broken up using a hemostat and more of the material was able to be expressed. The drainage cavity was then irrigated, packed with sterile gauze, and dressed with a bandage. The patients tetanus status was up to date and did not require a booster dose. The patient tolerated the procedure well. Complications: lightheadedness (see MDM)            Is this patient to be included in the SEP-1 Core Measure?   No   Exclusion criteria - the patient is NOT to be included for SEP-1 Core Measure due to:  2+ SIRS criteria are not met      Consults:  None    History obtained from: Patient and Family (daughter)    Pertinent social determinants of health: None applicable    Chronic conditions potentially affecting care: None applicable    Review of other records:  None    Reassessment:  See MDM for details of medications given and reassessment    During the patient's ED course, the patient was given:  Medications   lidocaine-EPINEPHrine 2 percent-1:726973 injection 20 mL (has no administration in time range)   lidocaine-EPINEPHrine 2 percent-1:926349 injection (has no administration in time range)   ondansetron (ZOFRAN) injection 4 mg (4 mg IntraVENous Given 3/1/23 8246)   sulfamethoxazole-trimethoprim (BACTRIM DS;SEPTRA DS) 800-160 MG per tablet 1 tablet (1 tablet Oral Given 3/1/23 4345)   HYDROcodone-acetaminophen (NORCO) 5-325 MG per tablet 2 tablet (2 tablets Oral Given 3/1/23 0521)   cephALEXin (KEFLEX) capsule 500 mg (500 mg Oral Given 3/1/23 0521)   0.9 % sodium chloride IV bolus 1,000 mL (1,000 mLs IntraVENous New Bag 3/1/23 6547)        CLINICAL IMPRESSION  1. Back abscess    2. Cellulitis of back    3. New onset atrial fibrillation ([address]    4. Near syncope        Blood pressure 122/62, pulse 88, temperature 98.1 °F (36.7 °C), temperature source Oral, resp. rate 19, height 5' 6\" (1.676 m), weight 274 lb (124.3 kg), SpO2 96 %. DISPOSITION  Joelle Nixon was admitted in stable condition. The plan is to admit to the hospital at this time under the hospitalist service. Hospitalist accepted the patient and will take over the patient's care. Critical Care Time:  The total critical care time I independently spent while evaluating and treating this patient was 35 minutes. This excludes time spent doing separately billable procedures. This includes time at the bedside, data interpretation, medication management, obtaining critical history from collateral sources if the patient is unable to provide it directly, and physician consultation. Specifics of interventions taken and potentially life-threatening diagnostic considerations are listed above in the medical decision making. If this was a shared visit with an WA[facility]AS, the time in this attestation is non-concurrent critical care time out of the total shared critical care time provided by the WAQAS and myself. DISCLAIMER: This chart was created using Dragon dictation software. Efforts were made by me to ensure accuracy, however some errors may be present due to limitations of this technology and occasionally words are not transcribed correctly.             Josette Aldana MD  03/01/23 3524 Humza Wylie MD  03/01/23 8100

## 2023-03-01 NOTE — CONSULTS
Infectious Diseases   Consult Note        Admission Date: 3/1/2023  Hospital Day: Hospital Day: 1   Attending: Cheikh Acosta MD  Date of service: 3/1/23     Reason for admission: New onset atrial fibrillation [facility]) [I48.91]  Cellulitis of back [L03.312]  Near syncope [R55]  Back abscess [L02.212]  Hypotension after procedure [I95.81]    Chief complaint/ Reason for consult: Worsening back abscess      Microbiology:        I have reviewed allavailable micro lab data and cultures    Blood culture (2/2) - collected on 3/1/2023: in process      Antibiotics and immunizations:       Current antibiotics: All antibiotics and their doses were reviewed by me    Recent Abx Admin                     ceFAZolin (ANCEF) 2,000 mg in sodium chloride 0.9 % 50 mL IVPB (mini-bag) (mg) 2,000 mg New Bag 03/01/23 0907    cephALEXin (KEFLEX) capsule 500 mg (mg) 500 mg Given 03/01/23 0521    sulfamethoxazole-trimethoprim (BACTRIM DS;SEPTRA DS) 800-160 MG per tablet 1 tablet (tablet) 1 tablet Given 03/01/23 2794                      Immunization History: All immunization history was reviewed by me today. There is no immunization history on file for this patient. Known drug allergies: All allergies were reviewed and updated    No Known Allergies    Social history:     Social History:  All social andepidemiologic history was reviewed and updated by me today as needed. Tobacco use:   reports that she has never smoked. She does not have any smokeless tobacco history on file. Alcohol use:   reports no history of alcohol use. Currently lives in: [address] [place] [address]   reports no history of drug use.      COVID VACCINATION AND LAB RESULT RECORDS:     Internal Administration   First Dose      Second Dose           Last COVID Lab No results found for: SARS-COV-2, SARS-COV-2 RNA, SARS-COV-2, SARS-COV-2, SARS-COV-2 BY PCR, SARS-COV-2, SARS-COV-2, SARS-COV-2         Assessment:     The patient is a 79 y.o. old female who  has a past medical history of Arthritis (2009), Asthma (2001), Diabetes mellitus ([address] (2005), Hyperlipidemia, and Hypertension. with following problems:    Worsening back abscess  Bandemia  Type 2 diabetes mellitus  Atrial fibrillation  Essential hypertension  Mixed hyperlipidemia  Obesity Class 3 due to excess calorie intake : Body mass index is 44.52 kg/m². Discussion:      The patient has an abscess on the back area. White cell count was 12,400. The patient has longstanding type 2 diabetes mellitus    She received oral Keflex and oral Bactrim yesterday and is currently on IV cefazolin    The patient had a near syncopal episode before admission    Plan:     Diagnostic Workup:    Please make sure 2 sets of blood cultures have been collected and sent  Will order wound culture  We will order sed rate and CRP  Continue to follow fever curve, WBC count and blood cultures  Follow up on liverand renal functions closely    Antimicrobials: Will order PO Linezolid 600 mg every 12 hours. Staph infection suspected  Will stop iv cefazolin  Will order iv cefepime 2 gm every 12 hours for empiric Gram negative coverage  We will follow up on the culture results and clinical progress and will make further recommendations accordingly. Continue close vitals monitoring. Maintain good glycemic control. Fall precautions. Aspiration precautions. Continue to watch for new fever or diarrhea. DVT prophylaxis. Discussed all above with patient and RN. Drug Monitoring:    Continue serial monitoring for antibiotic toxicity as follows: CBC, CMP  Continue to watch for following: new or worsening fever, hypotension, hives, lip swelling and redness or purulence at vascular access sites. I/v access Management:    Continue to monitor i.v access sites for erythema, induration, discharge or tenderness.    As always, continue efforts to minimizetubes/lines/drains as clinically appropriate to reduce chances of line associated infections. Current isolation precautions: There are no current isolations documented for this patient. Level of complexity of visit and medical decision making: High     Risk of Complications/Morbidity: High     Illness(es)/ Infection present that pose threat to life/bodily function. There is potential for severe exacerbation of infection/side effects of treatment. Therapy requires intensive monitoring for antimicrobial agent toxicity. Thank you for involving me in the care of your patient. I will continue to follow. If you have any additional questions, please do not hesitate to contact me. Subjective:     Presenting complaint in ER:     Chief Complaint   Patient presents with    Abscess     Patient arrives with c/o red, raised, painful area to mid back that she noticed on Sunday. Area has since gotten larger and started to drain. HPI: Emily Dumont is a 79 y.o. female patient, who was seen at the request of Dr. Cheikh Acosta MD.    History was obtained from chart review and the patient. The patient was admitted on 3/1/2023. I have been consulted to see the patient for above mentioned reason(s). The patient has multiple medical comorbidities, and presented to the ER for Painful abscess on the back area. Patient's symptoms reportedly started on Sunday when she noticed a pimple at her back area. It became increasingly red and swollen and then started having thick yellowish foul-smelling malodorous discharge. The patient was having worsening pain. She got concerned and came to the ER. In the ER, her white cell count was 12,500.    2 sets of blood cultures and wound cultures were sent and the patient was admitted    I have been asked for my opinion for management for this patient. Past Medical History: All past medical history reviewed today.     Past Medical History:   Diagnosis Date    Arthritis 2009    Asthma 2001    Diabetes mellitus ([facility] 2005 Hyperlipidemia     Hypertension          Past Surgical History: All pastsurgical history was reviewed today. Past Surgical History:   Procedure Laterality Date    ACHILLES TENDON SURGERY      APPENDECTOMY  1978    CHOLECYSTECTOMY  1978    COLONOSCOPY      FRACTURE SURGERY      JOINT REPLACEMENT      bilateral partial knee         Family History: All family history was reviewed today. History reviewed. No pertinent family history. Medications: All current and past medications were reviewed.     Medications Prior to Admission: acetaminophen (TYLENOL) 500 MG tablet, Take 1,000 mg by mouth every 6 hours as needed for Pain  Calcium Carbonate-Vitamin D (OYSTER SHELL CALCIUM/D) 500-200 MG-UNIT TABS, TAKE 1 TABLET BY MOUTH TWICE A DAY  metFORMIN (GLUCOPHAGE) 500 MG tablet, TAKE ONE TABLET BY MOUTH TWICE A DAY WITH FOOD FOR DIABETES  meloxicam (MOBIC) 15 MG tablet, TAKE ONE TABLET BY MOUTH ONCE DAILY WITH FOOD FOR PAIN/INFLAMMATION (Patient not taking: Reported on 3/1/2023)  lidocaine (LIDODERM) 5 %, APPLY 1 PATCH TO SKIN ONCE DAILY AS NEEDED MAY LEAVE ON UP TO 12 HOURS PER DAY (PRESS FIRMLY FOR 10-15 SECONDS FOR BEST ADHERENCE) (Patient not taking: Reported on 3/1/2023)  vitamin D (CHOLECALCIFEROL) 25 MCG (1000 UT) TABS tablet, 1,000 Units in the morning and at bedtime  atorvastatin (LIPITOR) 80 MG tablet, TAKE ONE TABLET BY MOUTH AT BEDTIME FOR CHOLESTEROL  ibuprofen-famotidine 800-26.6 MG TABS, Take 1 tablet by mouth 2 times daily as needed (Patient not taking: Reported on 3/1/2023)  lisinopril-hydrochlorothiazide (PRINZIDE;ZESTORETIC) 20-25 MG per tablet, Take 1 tablet by mouth 2 times daily  naproxen (NAPROSYN) 500 MG tablet, Take 1 tablet by mouth 2 times daily for 20 doses     lidocaine-EPINEPHrine        atorvastatin  40 mg Oral Nightly    sodium chloride flush  5-40 mL IntraVENous 2 times per day    enoxaparin  30 mg SubCUTAneous BID    ceFAZolin  2,000 mg IntraVENous Q8H    insulin lispro  0-4 Units SubCUTAneous TID WC    insulin lispro  0-4 Units SubCUTAneous Nightly    lisinopril  20 mg Oral Daily    And    hydroCHLOROthiazide  25 mg Oral Daily          REVIEW OF SYSTEMS:       Review of Systems   Constitutional:  Negative for chills, diaphoresis and fever. HENT:  Negative for ear discharge, ear pain, postnasal drip, rhinorrhea, sinus pressure, sinus pain and sore throat. Eyes:  Negative for discharge and redness. Respiratory:  Negative for cough, shortness of breath and wheezing. Cardiovascular:  Negative for chest pain and leg swelling. Gastrointestinal:  Negative for abdominal pain, constipation, diarrhea and nausea. Endocrine: Negative for cold intolerance, heat intolerance and polydipsia. Genitourinary:  Negative for dysuria, flank pain, frequency, hematuria and urgency. Musculoskeletal:  Positive for back pain and myalgias. Back abscess   Skin:  Negative for rash. Allergic/Immunologic: Negative for immunocompromised state. Neurological:  Negative for dizziness, seizures and headaches. Hematological:  Does not bruise/bleed easily. Psychiatric/Behavioral:  Negative for agitation, hallucinations and suicidal ideas. The patient is not nervous/anxious. All other systems reviewed and are negative. Objective:       PHYSICAL EXAM:      Vitals:   Vitals:    03/01/23 0645 03/01/23 0707 03/01/23 0854 03/01/23 1201   BP: 124/79 (!) 158/73 113/63 (!) 143/91   Pulse: 99 91 92 92   Resp: 16 18 16    Temp:  97.4 °F (36.3 °C) 97.8 °F (36.6 °C)    TempSrc:  Oral Oral    SpO2: 95% 93% 94% 94%   Weight:  275 lb 12.8 oz (125.1 kg)     Height:  5' 6\" (1.676 m)         Physical Exam  Vitals and nursing note reviewed. Constitutional:       Appearance: She is well-developed. She is not diaphoretic. Comments: The patient was seen earlier today. HENT:      Head: Normocephalic and atraumatic. Right Ear: External ear normal. There is no impacted cerumen.       Left Ear: External ear normal. There is no impacted cerumen. Nose: Nose normal.      Mouth/Throat:      Mouth: Mucous membranes are moist.      Pharynx: Oropharynx is clear. No oropharyngeal exudate. Eyes:      General: No scleral icterus. Right eye: No discharge. Left eye: No discharge. Conjunctiva/sclera: Conjunctivae normal.      Pupils: Pupils are equal, round, and reactive to light. Neck:      Thyroid: No thyromegaly. Cardiovascular:      Rate and Rhythm: Normal rate and regular rhythm. Heart sounds: Normal heart sounds. No murmur heard. No friction rub. Pulmonary:      Effort: No respiratory distress. Breath sounds: No stridor. No wheezing or rales. Abdominal:      General: Bowel sounds are normal.      Palpations: Abdomen is soft. Tenderness: There is no abdominal tenderness. There is no guarding or rebound. Musculoskeletal:         General: Swelling and tenderness present. No deformity. Normal range of motion. Cervical back: Normal range of motion and neck supple. Right lower leg: No edema. Left lower leg: No edema. Comments: Back abscess with purulence   Lymphadenopathy:      Cervical: No cervical adenopathy. Skin:     General: Skin is warm and dry. Coloration: Skin is not jaundiced. Findings: No bruising, erythema or rash. Neurological:      General: No focal deficit present. Mental Status: She is alert and oriented to person, place, and time. Mental status is at baseline. Motor: No abnormal muscle tone. Psychiatric:         Mood and Affect: Mood normal.         Behavior: Behavior normal.         Lines and drains: All vascular access sites are healthy with no local erythema, discharge or tenderness. Intake and output:     No intake/output data recorded. Lab Data:   All available labs were reviewed by me today.      CBC:   Recent Labs     03/01/23  0439   WBC 12.5*   RBC 4.04   HGB 11.6*   HCT 35.4*    MCV 87.5   MCH 28.8   MCHC 32.9   RDW 15.7*        BMP:  Recent Labs     03/01/23  0439      K 4.5      CO2 25   BUN 30*   CREATININE 1.0   CALCIUM 10.0   GLUCOSE 169*        Hepatic FunctionPanel:   Lab Results   Component Value Date/Time    ALKPHOS 98 03/01/2023 04:39 AM    ALT 10 03/01/2023 04:39 AM    AST 13 03/01/2023 04:39 AM    PROT 7.3 03/01/2023 04:39 AM    BILITOT 0.6 03/01/2023 04:39 AM    LABALBU 3.8 03/01/2023 04:39 AM       CPK: No results found for: CKTOTAL  ESR:   Lab Results   Component Value Date    SEDRATE 29 03/26/2010     CRP: No results found for: CRP      Imaging: All pertinent images and reports for the current visit were reviewed by me during this visit. I reviewed the chest x-ray/CT scan/MRI images and independently interpreted the findings and results today. XR CHEST PORTABLE   Final Result   1. No acute cardiopulmonary disease. Outside records:    Labs, Microbiology, Radiology and pertinent results from Care everywhere, if available, were reviewed as a part ofthe consultation. Problem list:       Patient Active Problem List   Diagnosis Code    Hypotension after procedure I95.81    Back abscess L02.212    Cellulitis of back L03.312    Near syncope R55    New onset atrial fibrillation ([facility] [address] [address] I48.91    Bandemia D72.825    Morbid obesity due to excess calories ([facility] [address] E66.01    Mixed hyperlipidemia E78.2         Please note that this chart was generated using Dragon dictation software. Although every effort was made to ensure the accuracy of this automated transcription, some errors in transcription may have occurred inadvertently. If you may need any clarification, please do not hesitate to contact me through EPIC or at the phone number provided below with my electronic signature.   Any pictures or media included in this note were obtained after taking informed verbal consent from the patient and with their approval to include those in the patient's medical record. Shayy Waggoner MD, MPH, [address]  3/1/2023, 1:17 PM  [facility]   [address] [place] [address], [address] [place] [address]  Office: [phone]  Fax: [phone]  In-person Clinic days:  Tuesday & Thursday a.m. Virtual clinic days: Monday, Wednesday & Friday a.m.

## 2023-03-01 NOTE — CONSULTS
[address]   Electrophysiology Consultation     Date: 3/1/2023  Reason for Consultation: atrial fibrillation   Consult Requesting Physician: Shonda Matias MD     CC: redness, swelling back     HPI:   Stephanie Lugo is a 79 y.o. female initially presented for symptomatic soft tissue abscess on back that required I&D in the emergency department. Started on antibiotics. EKG with atrial fibrillation, denies palpitations, racing heart. No prior history of atrial fibrillation, routinely follows at the [facility] for care. Review of System:  Complete 10 point ROS performed and negative unless noted in above HPI or below    Prior to Admission medications    Medication Sig Start Date End Date Taking? Authorizing Provider   sulfamethoxazole-trimethoprim (BACTRIM DS) 800-160 MG per tablet Take 1 tablet by mouth 2 times daily for 10 days 3/1/23 3/11/23 Yes Cynthia Soliz MD   cephALEXin [facility]) 500 MG capsule Take 1 capsule by mouth 4 times daily for 10 days 3/1/23 3/11/23 Yes Cynthia Soliz MD   HYDROcodone-acetaminophen (NORCO) 5-325 MG per tablet Take 1 tablet by mouth every 6 hours as needed for Pain (CAUTION: Can cause dizziness, don't drive while taking.) for up to 4 days.  Max Daily Amount: 4 tablets 3/1/23 3/5/23 Yes Cynthia Soilz MD   acetaminophen (TYLENOL) 500 MG tablet Take 1,000 mg by mouth every 6 hours as needed for Pain   Yes Historical Provider, MD   Calcium Carbonate-Vitamin D (OYSTER SHELL CALCIUM/D) 500-200 MG-UNIT TABS TAKE 1 TABLET BY MOUTH TWICE A DAY 7/1/21   Historical Provider, MD   metFORMIN (GLUCOPHAGE) 500 MG tablet TAKE ONE TABLET BY MOUTH TWICE A DAY WITH FOOD FOR DIABETES 6/23/21   Historical Provider, MD   meloxicam (MOBIC) 15 MG tablet TAKE ONE TABLET BY MOUTH ONCE DAILY WITH FOOD FOR PAIN/INFLAMMATION  Patient not taking: Reported on 3/1/2023 7/1/21   Historical Provider, MD   lidocaine (LIDODERM) 5 % APPLY 1 PATCH TO SKIN ONCE DAILY AS NEEDED MAY LEAVE ON UP TO 12 HOURS PER DAY (PRESS FIRMLY FOR 10-15 SECONDS FOR BEST ADHERENCE)  Patient not taking: Reported on 3/1/2023 1/15/21   Historical Provider, MD   vitamin D (CHOLECALCIFEROL) 25 MCG (1000 UT) TABS tablet 1,000 Units in the morning and at bedtime 21   Historical Provider, MD   atorvastatin (LIPITOR) 80 MG tablet TAKE ONE TABLET BY MOUTH AT BEDTIME FOR CHOLESTEROL 21   Historical Provider, MD   ibuprofen-famotidine 800-26.6 MG TABS Take 1 tablet by mouth 2 times daily as needed  Patient not taking: Reported on 3/1/2023    Historical Provider, MD   lisinopril-hydrochlorothiazide (PRINZIDE;ZESTORETIC) 20-25 MG per tablet Take 1 tablet by mouth 2 times daily    Historical Provider, MD   naproxen (NAPROSYN) 500 MG tablet Take 1 tablet by mouth 2 times daily for 20 doses 17  Ivette Scruggs PA-C       Past Medical History:   Diagnosis Date    Arthritis     Hypertension         Past Surgical History:   Procedure Laterality Date    ACHILLES TENDON SURGERY      JOINT REPLACEMENT      bilateral partial knee       No Known Allergies    Social History:  Reviewed. reports that she has never smoked. She does not have any smokeless tobacco history on file. She reports that she does not drink alcohol and does not use drugs. Family History:  Reviewed. No family history of SCD.       Physical Examination:  /63   Pulse 92   Temp 97.8 °F (36.6 °C) (Oral)   Resp 16   Ht 5' 6\" (1.676 m)   Wt 275 lb 12.8 oz (125.1 kg)   SpO2 94%   BMI 44.52 kg/m²   Temp  Av.8 °F (36.6 °C)  Min: 97.4 °F (36.3 °C)  Max: 98.1 °F (36.7 °C)  Pulse  Av.2  Min: 68  Max: 101  BP  Min: 64/55  Max: 158/73  SpO2  Av.5 %  Min: 93 %  Max: 98 %    Intake/Output Summary (Last 24 hours) at 3/1/2023 0941  Last data filed at 3/1/2023 0331  Gross per 24 hour   Intake 10 ml   Output --   Net 10 ml     No acute distress  Moist mucosa, nonicteric conjunctiva  Chest clear, nonlabored, no rhonchi or  Marital status: Single     Spouse name: Angy Suazo Number of children: Not on file    Years of education: 15    Highest education level: Not on file   Occupational History    Occupation: disabled   Social Needs    Financial resource strain: Not on file    Food insecurity:     Worry: Not on file     Inability: Not on file   Video[employer]atars needs:     Medical: Not on file     Non-medical: Not on file   Tobacco Use    Smoking status: Former Smoker     Packs/day: 1.50     Years: 16.00     Pack years: 24.00     Last attempt to quit: 2000     Years since quittin.1    Smokeless tobacco: Never Used   Substance and Sexual Activity    Alcohol use:  Yes     Alcohol/week: 0.0 standard drinks     Comment: occas    Drug use: No    Sexual activity: Yes     Partners: Female     Comment: 3-4 cups / day    Lifestyle    Physical activity:     Days per week: Not on file     Minutes per session: Not on file    Stress: Not on file   Relationships    Social connections:     Talks on phone: Not on file     Gets together: Not on file     Attends Sikhism service: Not on file     Active member of club or organization: Not on file     Attends meetings of clubs or organizations: Not on file     Relationship status: Not on file    Intimate partner violence:     Fear of current or ex partner: Not on file     Emotionally abused: Not on file     Physically abused: Not on file     Forced sexual activity: Not on file   Other Topics Concern    Not on file   Social History Narrative    Not on file     SURGICAL HISTORY     Past Surgical History:   Procedure Laterality Date    ACHILLES TENDON SURGERY      right    APPENDECTOMY      BLADDER SURGERY      fistula x2    CARPAL TUNNEL RELEASE  12    Release of Right Transverse Carpal Tunnel Ligament    COLON SURGERY      COLOSTOMY      CYST REMOVAL      CYSTOSCOPY  3/13/14    HERNIA REPAIR  14    lysis of adhesions    JOINT REPLACEMENT      KNEE wheeze  Heart is regular rate, irregular rhythm, no murmurs, trace pitting lower extremity swelling, no jugular venous distention  Abdomen is rounded, soft, nontender  Strength is grossly preserved, normal tone  No focal neurologic deficits  Appropriate mood and affect      Labs:    Potassium 4.5  Creatinine 1  proBNP 330  Troponin negative  WBC 12.5  Hemoglobin 11.6  Platelets 876    EKG 3/1  Atrial fibrillation    Stress    Cath    ECHO pending    Assessment/Plan:     Paroxsymal atrial fibrillation, new  Soft tissue abscess  Leukocytosis  DM  HTN      - new diagnosis of paroxsymal AF, no prv history, not on anticoagulation, asymptomatic, rate controlled  - discussed atrial fibrillation including symptoms, risk of stroke, potential effect on heart function  - discussed management strategies including a rate vs rhythm approach, role in improving symptoms and reducing AF burden, discussed rhythm strategies including medication and/or ablation in the future  - given that she is rate controlled and asymptomatic rate control strategy for now is reasonable, start anticoagulation and readdress rhythm in OP with cardioversion if she remain in AF to reassess symptoms  - transthoracic ECHO   - start eliquis 5mg BID ([id number] 4, age, female, HTN, DM)  - she would like to follow up at the [place], would schedule follow up with EP in 1-2 months here at [facility], this can always be canceled should she not be able to be seen at [place]       NOTE: This report was transcribed using voice recognition software. Every effort was made to ensure accuracy, however, inadvertent computerized transcription errors may be present.      Jeremy Galicia MD  [facility]   Office: [phone]  Fax: [phone] wheezing, No chest tenderness. Abdomen: scars consistent with stated surgeries,  Soft NTND   Rectal:  Deferred. Skin: Warm, Dry, No erythema, No rash. Back: No tenderness, No CVA tenderness. Extremities: Intact distal pulses, No edema, No tenderness, No cyanosis, No clubbing. Neurologic: Alert & oriented x 3, Normal motor function, Normal sensory function, No focal deficits noted.    RADIOLOGY/PROCEDURES     Reviewed per [address] reviewed per epic    FOLLOWUP     Screening Colonoscopy          Yasmine Camp 2/25/20 9:35 AM    CC:  Max Overall, APRN - CNP

## 2023-03-01 NOTE — PLAN OF CARE
Problem: Discharge Planning  Goal: Discharge to home or other facility with appropriate resources  Outcome: Progressing     Problem: Pain  Goal: Verbalizes/displays adequate comfort level or baseline comfort level  Outcome: Progressing     Problem: Discharge Planning  Goal: Discharge to home or other facility with appropriate resources  Outcome: Progressing     Problem: Pain  Goal: Verbalizes/displays adequate comfort level or baseline comfort level  Outcome: Progressing

## 2023-03-01 NOTE — DISCHARGE INSTRUCTIONS
Follow up with your PCP within 7-10 days of discharge.  [facility] or [facility] with [facility] cardiology as instructed; outpatient cardioversion for A-fib recommended  Follow up with general surgery as instructed   Follow up with infectious disease as instructed   Take all your medications as prescribed.

## 2023-03-01 NOTE — H&P
HOSPITALISTS HISTORY AND PHYSICAL    3/1/2023 11:44 AM    Patient Information:  Isamar Lucas is a 79 y.o. female [id number]  PCP:  Seb Tierney (Tel: [phone] )    Chief complaint:    Chief Complaint   Patient presents with    Abscess     Patient arrives with c/o red, raised, painful area to mid back that she noticed on Sunday. Area has since gotten larger and started to drain. History of Present Illness:  Thompson Ventura is a 79 y.o. female  with PMHx of hypertension, hyperlipidemia and diabetes mellitus presented for evaluation of painful, swollen area on the back. Patient reported that she noticed swelling in the middle of her back on Sunday and thought it might be a pimple but swelling got progressively larger, painful and started to drain thick yellowish malodorous discharge. Per patient's report, she thinks it might have been a spider/insect bite but never actually saw anything. Patient endorsed some nausea but denied any fever, chills, chest pain, shortness of breath trauma to the site, any previous hx of abscess/skin lesions, bowel or bladder dysfunction. Bedside US in the ED showed abscess pocket; underwent I&D. Patient became lightheaded and hypotensive during the procedure. BP improved with IV fluids. Patient was also noted to have new onset A-fib. REVIEW OF SYSTEMS:   Detailed 12 point ROS obtained which were negative except what mentioned above in HPI    Past Medical History:   has a past medical history of Arthritis, Asthma, Diabetes mellitus ([address] Hyperlipidemia, and Hypertension. Past Surgical History:   has a past surgical history that includes Achilles tendon surgery; joint replacement; Cholecystectomy (1978); Appendectomy (1978); Colonoscopy; and fracture surgery. Medications:  No current facility-administered medications on file prior to encounter. Current Outpatient Medications on File Prior to Encounter   Medication Sig Dispense Refill    acetaminophen (TYLENOL) 500 MG tablet Take 1,000 mg by mouth every 6 hours as needed for Pain      Calcium Carbonate-Vitamin D (OYSTER SHELL CALCIUM/D) 500-200 MG-UNIT TABS TAKE 1 TABLET BY MOUTH TWICE A DAY      metFORMIN (GLUCOPHAGE) 500 MG tablet TAKE ONE TABLET BY MOUTH TWICE A DAY WITH FOOD FOR DIABETES      meloxicam (MOBIC) 15 MG tablet TAKE ONE TABLET BY MOUTH ONCE DAILY WITH FOOD FOR PAIN/INFLAMMATION (Patient not taking: Reported on 3/1/2023)      lidocaine (LIDODERM) 5 % APPLY 1 PATCH TO SKIN ONCE DAILY AS NEEDED MAY LEAVE ON UP TO 12 HOURS PER DAY (PRESS FIRMLY FOR 10-15 SECONDS FOR BEST ADHERENCE) (Patient not taking: Reported on 3/1/2023)      vitamin D (CHOLECALCIFEROL) 25 MCG (1000 UT) TABS tablet 1,000 Units in the morning and at bedtime      atorvastatin (LIPITOR) 80 MG tablet TAKE ONE TABLET BY MOUTH AT BEDTIME FOR CHOLESTEROL      ibuprofen-famotidine 800-26.6 MG TABS Take 1 tablet by mouth 2 times daily as needed (Patient not taking: Reported on 3/1/2023)      lisinopril-hydrochlorothiazide (PRINZIDE;ZESTORETIC) 20-25 MG per tablet Take 1 tablet by mouth 2 times daily      naproxen (NAPROSYN) 500 MG tablet Take 1 tablet by mouth 2 times daily for 20 doses 20 tablet 0       Allergies:  No Known Allergies     Social History:   reports that she has never smoked. She does not have any smokeless tobacco history on file. She reports that she does not drink alcohol and does not use drugs. Family History:  family history is not on file. Physical Exam:  /63   Pulse 92   Temp 97.8 °F (36.6 °C) (Oral)   Resp 16   Ht 5' 6\" (1.676 m)   Wt 275 lb 12.8 oz (125.1 kg)   SpO2 94%   BMI 44.52 kg/m²     General appearance: No apparent distress appears stated age and cooperative. HEENT Normal cephalic, atraumatic without obvious deformity. PERRLA  Neck: Supple, No jugular venous distention/bruits. Trachea midline without thyromegaly   Lungs: Clear to auscultation, bilaterally without Rales/Wheezes/Rhonchi   Heart: Irregular rhythm with Normal S1/S2 without murmurs  Abdomen: Soft, non-tender, non-distended. Positive bowel sounds all four quadrants. Extremities: No clubbing, cyanosis, or edema bilaterally. Neurologic: CN 2-12 grossly intact. No speech or motor deficits  Psychiatry: Appropriate affect. Not agitated  Skin: Erythematous, indurated area in the middle back noted  Brisk capillary refill, peripheral pulses palpable     Labs:  CBC:   Lab Results   Component Value Date/Time    WBC 12.5 03/01/2023 04:39 AM    RBC 4.04 03/01/2023 04:39 AM    HGB 11.6 03/01/2023 04:39 AM    HCT 35.4 03/01/2023 04:39 AM    MCV 87.5 03/01/2023 04:39 AM    MCH 28.8 03/01/2023 04:39 AM    MCHC 32.9 03/01/2023 04:39 AM    RDW 15.7 03/01/2023 04:39 AM     03/01/2023 04:39 AM    MPV 10.0 03/01/2023 04:39 AM     BMP:    Lab Results   Component Value Date/Time     03/01/2023 04:39 AM    K 4.5 03/01/2023 04:39 AM     03/01/2023 04:39 AM    CO2 25 03/01/2023 04:39 AM    BUN 30 03/01/2023 04:39 AM    CREATININE 1.0 03/01/2023 04:39 AM    CALCIUM 10.0 03/01/2023 04:39 AM    LABGLOM >60 03/01/2023 04:39 AM    GLUCOSE 169 03/01/2023 04:39 AM     XR CHEST PORTABLE   Final Result   1. No acute cardiopulmonary disease. Discussed case  with ED physician    Problem List  Principal Problem:    Hypotension after procedure  Resolved Problems:    * No resolved hospital problems. *        Assessment/Plan:     Back abscess and cellulitis  Status post bedside I&D in the ED  Patient afebrile with normal white count in the ED  General surgery and infectious disease consulted. MRSA nare, blood and wound cultures ordered  Continued antibiotics awaiting for cultures and sensitivities    New onset A-fib: Rate controlled  Denies any chest pain, palpitation or previous history of A-fib  EKG with A-fib on admission. Troponin negative  Cardiology consulted. Echo ordered. Plan to start on Eliquis given [id number] score of 4  Monitor on telemetry    Hypertension: Continue home meds and monitor BP closely    Hyperlipidemia: Continue statins    Diabetes mellitus; hemoglobin A1c ordered. Hold oral antidiabetic medication  Started on SSI and monitor blood glucose closely      DVT prophylaxis: Lovenox  Code status: Full    Admit as inpatient. I anticipate hospitalization spanning less than two midnights for investigation and treatment of the above medically necessary diagnoses. Please note that some part of this chart was generated using Dragon dictation software. Although every effort was made to ensure the accuracy of this automated transcription, some errors in transcription may have occurred inadvertently. If you may need any clarification, please do not hesitate to contact me through [facility].        Merly Tucker MD    3/1/2023 11:44 AM

## 2023-03-01 NOTE — PROGRESS NOTES
Pre op Dx-infected sebaceous cyst of the left upper back    Post op Dx-same    Procedure-incision and drainage of infected sebaceous cyst of the left upper back    Surgeon-Dr. Ca Rahman    Anesthesia-local    EBL-min    Operative indications and consent-79year-old female with an infected sebaceous cyst of the left upper back. It has been partially drained by the ER. The plan is for repeat bedside I&D. Patient explained risks,benefits, complications and alternatives. Procedure-Patient prepped and draped in the usual sterile fashion. Right lateral decubitus position. The skin and subcutaneous tissues were infiltrated with 1% lidocaine. A 15 mm cruciate incision was made over the maximum point of fluctuance. Purulent fluid was expressed. Hemostasis was achieved with pressure. A dressing was then applied. The patient tolerated the procedure well.         Plan:  Dry bandage to site  May shower  Continue IV antibiotics  Will follow with you

## 2023-03-02 PROBLEM — Z71.89 DIABETES EDUCATION, ENCOUNTER FOR: Status: ACTIVE | Noted: 2023-03-02

## 2023-03-02 LAB
ANION GAP SERPL CALCULATED.3IONS-SCNC: 9 MMOL/L (ref 3–16)
BASOPHILS ABSOLUTE: 0.1 K/UL (ref 0–0.2)
BASOPHILS RELATIVE PERCENT: 0.7 %
BUN BLDV-MCNC: 18 MG/DL (ref 7–20)
CALCIUM SERPL-MCNC: 9 MG/DL (ref 8.3–10.6)
CHLORIDE BLD-SCNC: 104 MMOL/L (ref 99–110)
CO2: 25 MMOL/L (ref 21–32)
CREAT SERPL-MCNC: 0.8 MG/DL (ref 0.6–1.2)
EOSINOPHILS ABSOLUTE: 0.1 K/UL (ref 0–0.6)
EOSINOPHILS RELATIVE PERCENT: 1.7 %
ESTIMATED AVERAGE GLUCOSE: 137 MG/DL
GFR SERPL CREATININE-BSD FRML MDRD: >60 ML/MIN/{1.73_M2}
GLUCOSE BLD-MCNC: 107 MG/DL (ref 70–99)
GLUCOSE BLD-MCNC: 108 MG/DL (ref 70–99)
GLUCOSE BLD-MCNC: 123 MG/DL (ref 70–99)
GLUCOSE BLD-MCNC: 134 MG/DL (ref 70–99)
GLUCOSE BLD-MCNC: 138 MG/DL (ref 70–99)
GLUCOSE BLD-MCNC: 141 MG/DL (ref 70–99)
HBA1C MFR BLD: 6.4 %
HCT VFR BLD CALC: 35.2 % (ref 36–48)
HEMOGLOBIN: 11.8 G/DL (ref 12–16)
INR BLD: 1.24 (ref 0.87–1.14)
LV EF: 58 %
LVEF MODALITY: NORMAL
LYMPHOCYTES ABSOLUTE: 1.6 K/UL (ref 1–5.1)
LYMPHOCYTES RELATIVE PERCENT: 20.1 %
MCH RBC QN AUTO: 29 PG (ref 26–34)
MCHC RBC AUTO-ENTMCNC: 33.4 G/DL (ref 31–36)
MCV RBC AUTO: 86.7 FL (ref 80–100)
MONOCYTES ABSOLUTE: 0.7 K/UL (ref 0–1.3)
MONOCYTES RELATIVE PERCENT: 8.1 %
NEUTROPHILS ABSOLUTE: 5.7 K/UL (ref 1.7–7.7)
NEUTROPHILS RELATIVE PERCENT: 69.4 %
PDW BLD-RTO: 15.5 % (ref 12.4–15.4)
PERFORMED ON: ABNORMAL
PLATELET # BLD: 215 K/UL (ref 135–450)
PMV BLD AUTO: 10 FL (ref 5–10.5)
POTASSIUM SERPL-SCNC: 4.2 MMOL/L (ref 3.5–5.1)
PROTHROMBIN TIME: 15.5 SEC (ref 11.7–14.5)
RBC # BLD: 4.05 M/UL (ref 4–5.2)
SODIUM BLD-SCNC: 138 MMOL/L (ref 136–145)
WBC # BLD: 8.1 K/UL (ref 4–11)

## 2023-03-02 PROCEDURE — 2580000003 HC RX 258: Performed by: INTERNAL MEDICINE

## 2023-03-02 PROCEDURE — C8929 TTE W OR WO FOL WCON,DOPPLER: HCPCS

## 2023-03-02 PROCEDURE — 6360000002 HC RX W HCPCS: Performed by: INTERNAL MEDICINE

## 2023-03-02 PROCEDURE — 80048 BASIC METABOLIC PNL TOTAL CA: CPT

## 2023-03-02 PROCEDURE — 6370000000 HC RX 637 (ALT 250 FOR IP): Performed by: INTERNAL MEDICINE

## 2023-03-02 PROCEDURE — 1200000000 HC SEMI PRIVATE

## 2023-03-02 PROCEDURE — 85025 COMPLETE CBC W/AUTO DIFF WBC: CPT

## 2023-03-02 PROCEDURE — 36415 COLL VENOUS BLD VENIPUNCTURE: CPT

## 2023-03-02 PROCEDURE — 6360000004 HC RX CONTRAST MEDICATION: Performed by: INTERNAL MEDICINE

## 2023-03-02 PROCEDURE — 85610 PROTHROMBIN TIME: CPT

## 2023-03-02 RX ADMIN — CEFEPIME 2000 MG: 2 INJECTION, POWDER, FOR SOLUTION INTRAVENOUS at 09:42

## 2023-03-02 RX ADMIN — LINEZOLID 600 MG: 600 TABLET, FILM COATED ORAL at 09:38

## 2023-03-02 RX ADMIN — CEFEPIME 2000 MG: 2 INJECTION, POWDER, FOR SOLUTION INTRAVENOUS at 20:36

## 2023-03-02 RX ADMIN — Medication 10 ML: at 09:39

## 2023-03-02 RX ADMIN — Medication 10 ML: at 20:32

## 2023-03-02 RX ADMIN — APIXABAN 5 MG: 5 TABLET, FILM COATED ORAL at 09:38

## 2023-03-02 RX ADMIN — PERFLUTREN 1.5 ML: 6.52 INJECTION, SUSPENSION INTRAVENOUS at 16:17

## 2023-03-02 RX ADMIN — LINEZOLID 600 MG: 600 TABLET, FILM COATED ORAL at 20:31

## 2023-03-02 RX ADMIN — LISINOPRIL 20 MG: 20 TABLET ORAL at 09:38

## 2023-03-02 RX ADMIN — ATORVASTATIN CALCIUM 40 MG: 40 TABLET, FILM COATED ORAL at 20:31

## 2023-03-02 RX ADMIN — APIXABAN 5 MG: 5 TABLET, FILM COATED ORAL at 20:31

## 2023-03-02 ASSESSMENT — ENCOUNTER SYMPTOMS
CONSTIPATION: 0
SHORTNESS OF BREATH: 0
EYE REDNESS: 0
DIARRHEA: 0
WHEEZING: 0
COUGH: 0
SINUS PRESSURE: 0
SORE THROAT: 0
BACK PAIN: 1
EYE DISCHARGE: 0
SINUS PAIN: 0
ABDOMINAL PAIN: 0
NAUSEA: 0
RHINORRHEA: 0

## 2023-03-02 NOTE — PROGRESS NOTES
HOSPITALISTS PROGRESS NOTE    3/2/2023 7:58 AM        Name: Christina Mendieta . Admitted: 3/1/2023  Primary Care Provider: Alicia Oshea (Tel: [phone])      Brief Course: 79 y.o. female  with PMHx of hypertension, hyperlipidemia and diabetes mellitus presented for evaluation of painful, swollen area on the back. Bedside US in the ED showed abscess pocket; underwent I&D. Patient became lightheaded and hypotensive during the procedure. BP improved with IV fluids. Patient was also noted to have new onset A-fib. Interval history:   Pt seen and examined today   Overnight events noted and interval ancillary notes reviewed. Status post bedside I&D by general surgery yesterday  Afebrile overnight, WBCs trended down, cultures negative to date  Remains in A-fib with HR; continue Eliquis  Pt endorsed reported that he is feeling better today; denied any fever, chills, chest pain, shortness of breath, bowel or bladder dysfunction      Assessment & Plan:     Back abscess and cellulitis  Status post bedside I&D in the ED  Patient afebrile with normal white count in the ED  General surgery consulted; status post bedside I&D on 1/3/2023  MRSA nare pending. blood and wound cultures NGTD  Infectious disease on board; continue antibiotics per the recs     New onset A-fib: Rate controlled  Denies any chest pain, palpitation or previous history of A-fib  EKG with A-fib on admission. Troponin negative  Cardiology consulted. Echo ordered. Plan to start on Eliquis given [id number] score of 4  Monitor on telemetry     Hypertension: Continue home meds and monitor BP closely     Hyperlipidemia: Continue statins     Diabetes mellitus; hemoglobin A1c 6.4 on admission.   Hold oral antidiabetic medication  Started on SSI and monitor blood glucose closely        DVT PPX: Eliquis  Code:Full Code    Disposition: Once acute medical issues have resolved    Current Medications  atorvastatin (LIPITOR) tablet 40 mg, Nightly  sodium chloride flush 0.9 % injection 5-40 mL, 2 times per day  sodium chloride flush 0.9 % injection 5-40 mL, PRN  0.9 % sodium chloride infusion, PRN  ondansetron (ZOFRAN-ODT) disintegrating tablet 4 mg, Q8H PRN   Or  ondansetron (ZOFRAN) injection 4 mg, Q6H PRN  polyethylene glycol (GLYCOLAX) packet 17 g, Daily PRN  acetaminophen (TYLENOL) tablet 650 mg, Q6H PRN   Or  acetaminophen (TYLENOL) suppository 650 mg, Q6H PRN  perflutren lipid microspheres (DEFINITY) injection 1.5 mL, ONCE PRN  dextrose bolus 10% 125 mL, PRN   Or  dextrose bolus 10% 250 mL, PRN  glucagon (rDNA) injection 1 mg, PRN  dextrose 10 % infusion, Continuous PRN  insulin lispro (HUMALOG) injection vial 0-4 Units, TID WC  insulin lispro (HUMALOG) injection vial 0-4 Units, Nightly  lisinopril (PRINIVIL;ZESTRIL) tablet 20 mg, Daily   And  hydroCHLOROthiazide (HYDRODIURIL) tablet 25 mg, Daily  apixaban (ELIQUIS) tablet 5 mg, BID  linezolid (ZYVOX) tablet 600 mg, 2 times per day  cefepime (MAXIPIME) 2,000 mg in sodium chloride 0.9 % 50 mL IVPB (mini-bag), Q12H        Objective:  BP (!) 108/45   Pulse 96   Temp 97.8 °F (36.6 °C) (Oral)   Resp 18   Ht 5' 6\" (1.676 m)   Wt 273 lb (123.8 kg)   SpO2 91%   BMI 44.06 kg/m²     Intake/Output Summary (Last 24 hours) at 3/2/2023 0758  Last data filed at 3/2/2023 0430  Gross per 24 hour   Intake 1916.35 ml   Output 3950 ml   Net -2033.65 ml      Wt Readings from Last 3 Encounters:   03/02/23 273 lb (123.8 kg)   06/27/22 273 lb (123.8 kg)   07/11/21 285 lb (129.3 kg)       Physical Examination:   General appearance:  No apparent distress, appears stated age and cooperative. HEENT: Normocephalic, sclera clear., PERRLA. Trachea midline, no adenopathy. Cardiovascular: Irregular rhythm, normal S1, S2. No murmur. Respiratory:Clear to auscultation bilaterally, no wheeze or crackles.    GI: Abdomen soft, no tenderness, not distended, normal bowel sounds  Musculoskeletal: No cyanosis in digits.  No BLE edema present  Neurology: CN 2-12 grossly intact. No speech or motor deficits  Psych: Not agitated, appropriate affect  Skin: Dressing in place on the back.  Erythema and induration on the back improved    Labs and Tests:  CBC:   Recent Labs     03/01/23 0439 03/02/23 0441   WBC 12.5* 8.1   HGB 11.6* 11.8*    215     BMP:    Recent Labs     03/01/23 0439 03/02/23 0441    138   K 4.5 4.2    104   CO2 25 25   BUN 30* 18   CREATININE 1.0 0.8   GLUCOSE 169* 123*     Hepatic:   Recent Labs     03/01/23 0439   AST 13*   ALT 10   BILITOT 0.6   ALKPHOS 98     XR CHEST PORTABLE   Final Result   1. No acute cardiopulmonary disease.             Problem List  Principal Problem:    Hypotension after procedure  Active Problems:    Back abscess    Cellulitis of back    Near syncope    New onset atrial fibrillation (HCC)    Bandemia    Morbid obesity due to excess calories (HCC)    Mixed hyperlipidemia  Resolved Problems:    * No resolved hospital problems. *       LUKAS GARCIA MD   3/2/2023 7:58 AM

## 2023-03-02 NOTE — PLAN OF CARE
Problem: Discharge Planning  Goal: Discharge to home or other facility with appropriate resources  3/1/2023 2202 by Leonel Landon RN  Outcome: Progressing  3/1/2023 1109 by Smiley Stevenson RN  Outcome: Progressing     Problem: Pain  Goal: Verbalizes/displays adequate comfort level or baseline comfort level  3/1/2023 2202 by Leonel Landon RN  Outcome: Progressing  3/1/2023 1109 by Smiley Stevenson RN  Outcome: Progressing     Problem: Cardiovascular - Adult  Goal: Maintains optimal cardiac output and hemodynamic stability  Outcome: Progressing  Goal: Absence of cardiac dysrhythmias or at baseline  Outcome: Progressing     Problem: Skin/Tissue Integrity - Adult  Goal: Incisions, wounds, or drain sites healing without S/S of infection  Outcome: Progressing     Problem: Infection - Adult  Goal: Absence of infection at discharge  Outcome: Progressing

## 2023-03-02 NOTE — PROGRESS NOTES
Reassessment completed. No complaints of pain or discomfort. Patient denies further needs at this time.

## 2023-03-02 NOTE — PROGRESS NOTES
Shift assessment completed, see flowsheets. Medications administered, see MAR. Vital signs stable, SPO2 93% on room air. Plan of care discussed with patient. Call light and bedside table within reach. Pt denies further needs at this time. Will continue to monitor.   Sal Ramirez RN

## 2023-03-02 NOTE — PROGRESS NOTES
Infectious Diseases   Progress Note      Admission Date: 3/1/2023  Hospital Day: Hospital Day: 2   Attending: Malena Castro MD  Date of service: 3/2/2023     Chief complaint/ Reason for consult:     Worsening back abscess  Bandemia  Type 2 diabetes mellitus  Atrial fibrillation  Essential hypertension    Microbiology:        I have reviewed allavailable micro lab data and cultures    Blood culture (2/2) - collected on 3/1/2023: in process      Antibiotics and immunizations:       Current antibiotics: All antibiotics and their doses were reviewed by me    Recent Abx Admin                     cefepime (MAXIPIME) 2,000 mg in sodium chloride 0.9 % 50 mL IVPB (mini-bag) (mg) 2,000 mg New Bag 03/02/23 0942     2,000 mg New Bag 03/01/23 2126    linezolid (ZYVOX) tablet 600 mg (mg) 600 mg Given 03/02/23 0938     600 mg Given 03/01/23 2121    ceFAZolin (ANCEF) 2,000 mg in sodium chloride 0.9 % 50 mL IVPB (mini-bag) (mg) 2,000 mg New Bag 03/01/23 1631                      Immunization History: All immunization history was reviewed by me today. There is no immunization history on file for this patient. Known drug allergies: All allergies were reviewed and updated    No Known Allergies    Social history:     Social History:  All social andepidemiologic history was reviewed and updated by me today as needed. Tobacco use:   reports that she has never smoked. She does not have any smokeless tobacco history on file. Alcohol use:   reports no history of alcohol use. Currently lives in: [address] [place] [address]   reports no history of drug use.      COVID VACCINATION AND LAB RESULT RECORDS:     Internal Administration   First Dose      Second Dose           Last COVID Lab No results found for: SARS-COV-2, SARS-COV-2 RNA, SARS-COV-2, SARS-COV-2, SARS-COV-2 BY PCR, SARS-COV-2, SARS-COV-2, SARS-COV-2         Assessment:     The patient is a 79 y.o. old female who  has a past medical history of Arthritis (2009), Asthma (2001), Diabetes mellitus ([facility] [address] (2005), Hyperlipidemia, and Hypertension. with following problems:    Worsening back abscess-s/p bedside I&D on 3/1/2023 with general surgery  Bandemia-improved  Type 2 diabetes mellitus-counseling done  Atrial fibrillation  Essential hypertension  Mixed hyperlipidemia  Obesity Class 3 due to excess calorie intake : Body mass index is 44.52 kg/m². Discussion:      The patient is afebrile. 2 sets of blood cultures were sent on 3/1/2023. Sed rate was 53 and the CRP was 31.3 on 3/1/2023    Platelet count is 353,143    Serum creatinine 0.8 today      Plan:     Diagnostic Workup:    Follow-up on the back wound culture from yesterday  Continue to follow  fever curve, WBC count and blood cultures. Continue to monitor blood counts, liver and renal function. Antimicrobials: Will continue linezolid 600 mg every 12 hours  Will continue IV cefepime 2 g every 12 hours  Staphylococcal infection suspected. Patient had a bedside I&D done by general surgery yesterday  We will follow up on the culture results and clinical progress and will make further recommendations accordingly. Continue close vitals monitoring. Maintain good glycemic control. Fall precautions. Aspiration precautions. Continue to watch for new fever or diarrhea. DVT prophylaxis. Discussed all above with patient and RN. Drug Monitoring:    Continue monitoring for antibiotic toxicity as follows: CBC, CMP   Continue to watch for following: new or worsening fever, new hypotension, hives, lip swelling and redness or purulence at vascular access sites. I/v access Management:    Continue to monitor i.v access sites for erythema, induration, discharge or tenderness. As always, continue efforts to minimize tubes/lines/drains as clinically appropriate to reduce chances of line associated infections. Patient education and counseling:         The patient was educated in detail about the side-effects of various antibiotics and things to watch for like new rashes, lip swelling, severe reaction, worsening diarrhea, break through fever etc.  Discussed patient's condition and what to expect. All of the patient's questions were addressed in a satisfactory manner and patient verbalized understanding all instructions. Level of complexity of visit and medical decision making: High     Diabetes mellitus education and counseling:    Patient was educated in detail about the importance of keeping diabetes under control to improve the cure rate of infection and prevent future infections. Patient was advised to check blood glucose level regularly and to stay compliant with the diabetes medications. Patient was advised to the trim the toe nails carefully, wear shoes or slippers at all times and check both feet everyday before going to bed to look for any cuts, blisters, swelling or redness. Importance of washing the feet everyday with soap and water and keeping them dry, and seeking prompt medical attention in case of a non-healing wound or ulcer on the feet was also highlighted. TIME SPENT TODAY:     - Spent over  36 minutes on visit (including interval history, physical exam, review of data including labs, cultures, imaging, development and implementation of treatment plan and coordination of complex care). More than 50 percent of this includes face-to-face time spent with the patient for counseling and coordination of care. Thank you for involving me in the care of your patient. I will continue to follow. If you have anyadditional questions, please do not hesitate to contact me. Subjective: Interval history: Interval history was obtained from chart review and patient/ RN. The patient is afebrile. She is tolerating antibiotics okay. No diarrhea     REVIEW OF SYSTEMS:      Review of Systems   Constitutional:  Positive for fatigue. Negative for chills, diaphoresis and fever.    HENT:  Negative for ear discharge, ear pain, postnasal drip, rhinorrhea, sinus pressure, sinus pain and sore throat. Eyes:  Negative for discharge and redness. Respiratory:  Negative for cough, shortness of breath and wheezing. Cardiovascular:  Negative for chest pain and leg swelling. Gastrointestinal:  Negative for abdominal pain, constipation, diarrhea and nausea. Endocrine: Negative for cold intolerance, heat intolerance and polydipsia. Genitourinary:  Negative for dysuria, flank pain, frequency, hematuria and urgency. Musculoskeletal:  Positive for back pain. Negative for myalgias. Skin:  Negative for rash. Allergic/Immunologic: Negative for immunocompromised state. Neurological:  Negative for dizziness, seizures and headaches. Hematological:  Does not bruise/bleed easily. Psychiatric/Behavioral:  Negative for agitation, hallucinations and suicidal ideas. The patient is not nervous/anxious. All other systems reviewed and are negative. Past Medical History: All past medical history reviewed today. Past Medical History:   Diagnosis Date    Arthritis 2009    Asthma 2001    Diabetes mellitus ([facility] [address] 2005    Hyperlipidemia     Hypertension        Past Surgical History: All past surgical history was reviewed today. Past Surgical History:   Procedure Laterality Date    ACHILLES TENDON SURGERY      APPENDECTOMY  1978    CHOLECYSTECTOMY  1978    COLONOSCOPY      FRACTURE SURGERY      JOINT REPLACEMENT      bilateral partial knee       Family History: All family history was reviewed today. History reviewed. No pertinent family history.     Objective:       PHYSICAL EXAM:      Vitals:   Vitals:    03/02/23 0430 03/02/23 0845 03/02/23 0938 03/02/23 1227   BP: (!) 108/45 115/71 115/71 (!) 141/94   Pulse: 96 95  98   Resp: 18 18  18   Temp: 97.8 °F (36.6 °C) 97.7 °F (36.5 °C)  97.6 °F (36.4 °C)   TempSrc: Oral Oral  Oral   SpO2: 91% 93%  95%   Weight: 273 lb (123.8 kg)      Height:           Physical Exam  Vitals and nursing note reviewed. Constitutional:       Appearance: She is well-developed. She is not diaphoretic. Comments: The patient was seen earlier today. HENT:      Head: Normocephalic and atraumatic. Right Ear: External ear normal. There is no impacted cerumen. Left Ear: External ear normal. There is no impacted cerumen. Nose: Nose normal.      Mouth/Throat:      Mouth: Mucous membranes are moist.      Pharynx: Oropharynx is clear. No oropharyngeal exudate. Eyes:      General: No scleral icterus. Right eye: No discharge. Left eye: No discharge. Conjunctiva/sclera: Conjunctivae normal.      Pupils: Pupils are equal, round, and reactive to light. Neck:      Thyroid: No thyromegaly. Cardiovascular:      Rate and Rhythm: Normal rate and regular rhythm. Heart sounds: Normal heart sounds. No murmur heard. No friction rub. Pulmonary:      Effort: No respiratory distress. Breath sounds: No stridor. No wheezing or rales. Abdominal:      General: Bowel sounds are normal.      Palpations: Abdomen is soft. Tenderness: There is no abdominal tenderness. There is no guarding or rebound. Musculoskeletal:         General: Tenderness present. No swelling or deformity. Normal range of motion. Cervical back: Normal range of motion and neck supple. Right lower leg: No edema. Left lower leg: No edema. Comments: Back area redness and discharge   Lymphadenopathy:      Cervical: No cervical adenopathy. Skin:     General: Skin is warm and dry. Coloration: Skin is not jaundiced. Findings: No bruising, erythema or rash. Neurological:      General: No focal deficit present. Mental Status: She is alert and oriented to person, place, and time. Mental status is at baseline. Motor: No abnormal muscle tone.    Psychiatric:         Mood and Affect: Mood normal.         Behavior: Behavior normal.         Lines and drains: All vascular access sites are healthy with no local erythema, discharge or tenderness. Intake and output:    I/O last 3 completed shifts: In: 1916.4 [P.O.:870; I.V.:950.1; IV Piggyback:96.2]  Out: 3950 [Urine:3950]    Lab Data:   All available labs and old records have been reviewed by me. CBC:  Recent Labs     03/01/23 0439 03/02/23 0441   WBC 12.5* 8.1   RBC 4.04 4.05   HGB 11.6* 11.8*   HCT 35.4* 35.2*    215   MCV 87.5 86.7   MCH 28.8 29.0   MCHC 32.9 33.4   RDW 15.7* 15.5*        BMP:  Recent Labs     03/01/23 0439 03/02/23 0441    138   K 4.5 4.2    104   CO2 25 25   BUN 30* 18   CREATININE 1.0 0.8   CALCIUM 10.0 9.0   GLUCOSE 169* 123*        Hepatic Function Panel:   Lab Results   Component Value Date/Time    ALKPHOS 98 03/01/2023 04:39 AM    ALT 10 03/01/2023 04:39 AM    AST 13 03/01/2023 04:39 AM    PROT 7.3 03/01/2023 04:39 AM    BILITOT 0.6 03/01/2023 04:39 AM    LABALBU 3.8 03/01/2023 04:39 AM       CPK: No results found for: CKTOTAL  ESR:   Lab Results   Component Value Date    SEDRATE 53 (H) 03/01/2023     CRP:   Lab Results   Component Value Date    CRP 31.2 (H) 03/01/2023           Imaging: All pertinent images and reports for the current visit were reviewed by me during this visit. I reviewed the chest x-ray/CT scan/MRI images and independently interpreted the findings and results today. XR CHEST PORTABLE   Final Result   1. No acute cardiopulmonary disease. Medications: All current and past medications were reviewed.      atorvastatin  40 mg Oral Nightly    sodium chloride flush  5-40 mL IntraVENous 2 times per day    insulin lispro  0-4 Units SubCUTAneous TID WC    insulin lispro  0-4 Units SubCUTAneous Nightly    lisinopril  20 mg Oral Daily    apixaban  5 mg Oral BID    linezolid  600 mg Oral 2 times per day    cefepime  2,000 mg IntraVENous Q12H        sodium chloride      dextrose         sodium chloride flush, sodium chloride, ondansetron **OR** ondansetron, polyethylene glycol, acetaminophen **OR** acetaminophen, perflutren lipid microspheres, dextrose bolus **OR** dextrose bolus, glucagon (rDNA), dextrose      Problem list:       Patient Active Problem List   Diagnosis Code    Hypotension after procedure I95.81    Back abscess L02.212    Cellulitis of back L03.312    Near syncope R55    New onset atrial fibrillation ([facility] [address] I48.91    Bandemia D72.825    Morbid obesity due to excess calories ([address] E66.01    Mixed hyperlipidemia E78.2       Please note that this chart was generated using Dragon dictation software. Although every effort was made to ensure the accuracy of this automated transcription, some errors in transcription may have occurred inadvertently. If you may need any clarification, please do not hesitate to contact me through EPIC or at the phone number provided below with my electronic signature. Any pictures or media included in this note were obtained after taking informed verbal consent from the patient and with their approval to include those in the patient's medical record. Karen Montano MD, MPH, FACP, [facility]  3/2/2023, 1:27 PM  [facility] Infectious Disease   [address] [facility] [facility], [address] [place] [address]  Office: [phone]  Fax: [phone]  In-person Clinic days:  Tuesday & Thursday a.m. Virtual clinic days: Monday, Wednesday & Friday a.m.

## 2023-03-02 NOTE — PROGRESS NOTES
S/P I&D back abscess  Doing better  Erythema has regressed  Dry bandage to site  Continue antibiotics  Will follow peripherally  Follow up as outpatient in 2 weeks

## 2023-03-02 NOTE — PROGRESS NOTES
Reassessment completed. Call light and bedside table within reach. Patient denies further needs at this time.

## 2023-03-02 NOTE — CARE COORDINATION
Discharge Planning:     (CM) reviewed the patient's chart to assess needs. Patient's Readmission Risk Score is 8%. Patient's medical insurance is Medicare. Patient's PCP is Dr. Virginia Moore. No needs anticipated, at this time. CM team to follow. Staff to inform CM if additional discharge needs arise.       Alba THOMPSON, PETE, [facility] -   [phone]    Electronically signed by JAY White on 3/2/2023 at 8:47 AM

## 2023-03-02 NOTE — PLAN OF CARE
Problem: Discharge Planning  Goal: Discharge to home or other facility with appropriate resources  3/2/2023 1112 by Toya Gloria RN  Outcome: Progressing     Problem: Pain  Goal: Verbalizes/displays adequate comfort level or baseline comfort level  3/2/2023 1112 by Toya Gloria RN  Outcome: Progressing     Problem: Cardiovascular - Adult  Goal: Maintains optimal cardiac output and hemodynamic stability  3/2/2023 1112 by Toya Gloria RN  Outcome: Progressing     Problem: Cardiovascular - Adult  Goal: Absence of cardiac dysrhythmias or at baseline  3/2/2023 1112 by Toya Gloria RN  Outcome: Progressing     Problem: Skin/Tissue Integrity - Adult  Goal: Incisions, wounds, or drain sites healing without S/S of infection  3/2/2023 1112 by Toya Gloria RN  Outcome: Progressing     Problem: Infection - Adult  Goal: Absence of infection at discharge  3/2/2023 1112 by Toya Gloria RN  Outcome: Progressing     Problem: Chronic Conditions and Co-morbidities  Goal: Patient's chronic conditions and co-morbidity symptoms are monitored and maintained or improved  Outcome: Progressing

## 2023-03-03 VITALS
HEART RATE: 108 BPM | RESPIRATION RATE: 16 BRPM | BODY MASS INDEX: 43.79 KG/M2 | SYSTOLIC BLOOD PRESSURE: 139 MMHG | OXYGEN SATURATION: 96 % | TEMPERATURE: 98 F | DIASTOLIC BLOOD PRESSURE: 71 MMHG | WEIGHT: 272.5 LBS | HEIGHT: 66 IN

## 2023-03-03 PROBLEM — Z71.3 WEIGHT LOSS COUNSELING, ENCOUNTER FOR: Status: ACTIVE | Noted: 2023-03-02

## 2023-03-03 LAB
ANION GAP SERPL CALCULATED.3IONS-SCNC: 9 MMOL/L (ref 3–16)
BASOPHILS ABSOLUTE: 0.1 K/UL (ref 0–0.2)
BASOPHILS RELATIVE PERCENT: 0.8 %
BUN BLDV-MCNC: 17 MG/DL (ref 7–20)
CALCIUM SERPL-MCNC: 9.1 MG/DL (ref 8.3–10.6)
CHLORIDE BLD-SCNC: 102 MMOL/L (ref 99–110)
CO2: 27 MMOL/L (ref 21–32)
CREAT SERPL-MCNC: 1 MG/DL (ref 0.6–1.2)
EOSINOPHILS ABSOLUTE: 0.2 K/UL (ref 0–0.6)
EOSINOPHILS RELATIVE PERCENT: 3.1 %
GFR SERPL CREATININE-BSD FRML MDRD: >60 ML/MIN/{1.73_M2}
GLUCOSE BLD-MCNC: 105 MG/DL (ref 70–99)
GLUCOSE BLD-MCNC: 168 MG/DL (ref 70–99)
GLUCOSE BLD-MCNC: 99 MG/DL (ref 70–99)
HCT VFR BLD CALC: 36.4 % (ref 36–48)
HEMOGLOBIN: 11.8 G/DL (ref 12–16)
LYMPHOCYTES ABSOLUTE: 2.3 K/UL (ref 1–5.1)
LYMPHOCYTES RELATIVE PERCENT: 30.1 %
MCH RBC QN AUTO: 28.2 PG (ref 26–34)
MCHC RBC AUTO-ENTMCNC: 32.3 G/DL (ref 31–36)
MCV RBC AUTO: 87.2 FL (ref 80–100)
MONOCYTES ABSOLUTE: 0.7 K/UL (ref 0–1.3)
MONOCYTES RELATIVE PERCENT: 9.4 %
MRSA CULTURE ONLY: NORMAL
NEUTROPHILS ABSOLUTE: 4.3 K/UL (ref 1.7–7.7)
NEUTROPHILS RELATIVE PERCENT: 56.6 %
PDW BLD-RTO: 15.7 % (ref 12.4–15.4)
PERFORMED ON: ABNORMAL
PERFORMED ON: NORMAL
PLATELET # BLD: 223 K/UL (ref 135–450)
PMV BLD AUTO: 9.3 FL (ref 5–10.5)
POTASSIUM SERPL-SCNC: 4.4 MMOL/L (ref 3.5–5.1)
RBC # BLD: 4.18 M/UL (ref 4–5.2)
SODIUM BLD-SCNC: 138 MMOL/L (ref 136–145)
WBC # BLD: 7.6 K/UL (ref 4–11)

## 2023-03-03 PROCEDURE — 2580000003 HC RX 258: Performed by: INTERNAL MEDICINE

## 2023-03-03 PROCEDURE — 97161 PT EVAL LOW COMPLEX 20 MIN: CPT

## 2023-03-03 PROCEDURE — 36415 COLL VENOUS BLD VENIPUNCTURE: CPT

## 2023-03-03 PROCEDURE — 6370000000 HC RX 637 (ALT 250 FOR IP): Performed by: INTERNAL MEDICINE

## 2023-03-03 PROCEDURE — 97166 OT EVAL MOD COMPLEX 45 MIN: CPT

## 2023-03-03 PROCEDURE — 80048 BASIC METABOLIC PNL TOTAL CA: CPT

## 2023-03-03 PROCEDURE — 6360000002 HC RX W HCPCS: Performed by: INTERNAL MEDICINE

## 2023-03-03 PROCEDURE — 85025 COMPLETE CBC W/AUTO DIFF WBC: CPT

## 2023-03-03 PROCEDURE — 87205 SMEAR GRAM STAIN: CPT

## 2023-03-03 PROCEDURE — 87070 CULTURE OTHR SPECIMN AEROBIC: CPT

## 2023-03-03 RX ORDER — FERROUS SULFATE 325(65) MG
325 TABLET ORAL 2 TIMES DAILY WITH MEALS
Status: DISCONTINUED | OUTPATIENT
Start: 2023-03-03 | End: 2023-03-03 | Stop reason: HOSPADM

## 2023-03-03 RX ORDER — FERROUS SULFATE 325(65) MG
325 TABLET ORAL 2 TIMES DAILY WITH MEALS
Qty: 30 TABLET | Refills: 0 | Status: CANCELLED | OUTPATIENT
Start: 2023-03-03

## 2023-03-03 RX ORDER — LINEZOLID 600 MG/1
600 TABLET, FILM COATED ORAL EVERY 12 HOURS SCHEDULED
Qty: 20 TABLET | Refills: 0 | Status: SHIPPED | OUTPATIENT
Start: 2023-03-03 | End: 2023-03-13

## 2023-03-03 RX ORDER — FERROUS SULFATE 325(65) MG
325 TABLET ORAL 2 TIMES DAILY WITH MEALS
Qty: 30 TABLET | Refills: 0 | Status: SHIPPED | OUTPATIENT
Start: 2023-03-03

## 2023-03-03 RX ADMIN — FERROUS SULFATE TAB 325 MG (65 MG ELEMENTAL FE) 325 MG: 325 (65 FE) TAB at 08:47

## 2023-03-03 RX ADMIN — APIXABAN 5 MG: 5 TABLET, FILM COATED ORAL at 08:49

## 2023-03-03 RX ADMIN — CEFEPIME 2000 MG: 2 INJECTION, POWDER, FOR SOLUTION INTRAVENOUS at 08:47

## 2023-03-03 RX ADMIN — LISINOPRIL 20 MG: 20 TABLET ORAL at 08:50

## 2023-03-03 RX ADMIN — LINEZOLID 600 MG: 600 TABLET, FILM COATED ORAL at 08:47

## 2023-03-03 RX ADMIN — Medication 10 ML: at 08:49

## 2023-03-03 ASSESSMENT — ENCOUNTER SYMPTOMS
EYE DISCHARGE: 0
SORE THROAT: 0
SHORTNESS OF BREATH: 0
RHINORRHEA: 0
NAUSEA: 0
BACK PAIN: 0
SINUS PRESSURE: 0
SINUS PAIN: 0
CONSTIPATION: 0
DIARRHEA: 0
COUGH: 0
ABDOMINAL PAIN: 0
WHEEZING: 0
EYE REDNESS: 0

## 2023-03-03 NOTE — PROGRESS NOTES
PM assessment complete and documented. Meds given per MAR. Antibiotics given as ordered. Pt resting in bed. Denies needs at present. Will continue to monitor.

## 2023-03-03 NOTE — PROGRESS NOTES
[facility]   Phone: [phone]    Physical Therapy    [x] Initial Evaluation            [] Daily Treatment Note         [x] Discharge Summary      Patient: Cristian Vergara   : 1955   MRN: [id number]   Date of Service:  3/3/2023  Admitting Diagnosis: Hypotension after procedure  Current Admission Summary: Per H&P on 3/1 \"67 y.o. female  with PMHx of hypertension, hyperlipidemia and diabetes mellitus presented for evaluation of painful, swollen area on the back. Patient reported that she noticed swelling in the middle of her back on  and thought it might be a pimple but swelling got progressively larger, painful and started to drain thick yellowish malodorous discharge. Per patient's report, she thinks it might have been a spider/insect bite but never actually saw anything. Patient endorsed some nausea but denied any fever, chills, chest pain, shortness of breath trauma to the site, any previous hx of abscess/skin lesions, bowel or bladder dysfunction. Bedside US in the ED showed abscess pocket; underwent I&D. Patient became lightheaded and hypotensive during the procedure. BP improved with IV fluids. Patient was also noted to have new onset A-fib. \"  Past Medical History:  has a past medical history of Arthritis, Asthma, Diabetes mellitus ([address] Hyperlipidemia, and Hypertension. Past Surgical History:  has a past surgical history that includes Achilles tendon surgery; joint replacement; Cholecystectomy (); Appendectomy (); Colonoscopy; and fracture surgery. Discharge Recommendations: Cristian Vergara scored a 24/24 on the AM-PAC short mobility form. At this time, no further PT is recommended upon discharge due to patient at independent level. Recommend patient returns to prior setting with prior services.     DME Required For Discharge: No new DME required    Precautions/Restrictions: high fall risk (recommend low based on functional mobility)  Positional Restrictions:no positional restrictions    Pre-Admission Information   Lives With: spouse                  Type of Home: house  Home Layout: two level  Home Access:  3 step to enter with handrail. Handrails are located on both sides side. Bathroom Layout: tub/shower unit, has shower seat  Toilet Height: standard height  Bathroom Equipment: shower chair, hand held shower head  Home Equipment: rolling walker, single point cane  Transfer Assistance: Independent without use of device  Ambulation Assistance:Independent without use of device  ADL Assistance: independent with all ADL's  IADL Assistance: independent with homemaking tasks  Active : [x] yes             []no  Current Employment: retired. Occupation:   Hobbies: sewing, [place], cooking  Recent Falls: none    Examination   Vision:   Vision Corrective Device: wears glasses for reading (bifocals)  Hearing:   WFL  Observation:   General Observation:  Pt on RA throughout session  Posture:   Fair - mild forward flexed posture  Sensation:   WFL  ROM:   (B) LE ROM WFL  Strength:   (B) LE gross strength WFL  Therapist Clinical Decision Making (Complexity): low complexity  Clinical Presentation: stable      Subjective  General: Patient semi-reclined in bed upon therapist arrival. Patient agreeable to PT/OT eval. Spouse present.   Pain: 0/10  Pain Interventions: not applicable       Functional Mobility  Bed Mobility  Supine to Sit: modified independent  Scooting: Independent  Comments: HOB elevated  Transfers  Sit to stand transfer: Independent  Stand to sit transfer: Independent  Comments:  Ambulation  Surface:level surface  Assistive Device: no device  Assistance: Independent  Distance: 220 ft  Gait Mechanics: Decreased (R) stance time, increased (L) lateral sway  Comments: Intermittent use of handrail due to OA pain  Stair Mobility  Number of Steps: 6  Step Height: 6 inch  Hand Rails: (R) ascending handrail  Assistance: modified independent  Comments: Reciprocal pattern ascending but slow due to OA pain. Step to pattern descending. Balance  Static Sitting Balance: good: independent with functional balance in unsupported position  Dynamic Sitting Balance: good(+): independent with high level dynamic balance in unsupported position  Static Standing Balance: good: independent with functional balance in unsupported position  Dynamic Standing Balance: good(+): independent with high level dynamic balance in unsupported position  Comments: no LOB    Other Therapeutic Interventions  See OT note for dressing. Functional Outcomes  AM-PAC Inpatient Mobility Raw Score : 24              Cognition  WFL  Orientation:    A&O x 4    Education  Barriers To Learning: none  Patient Education: patient educated on goals, PT role and benefits, plan of care, general safety, discharge recommendations  Learning Assessment:  Pt verbalized and demonstrates understanding    Assessment  Activity Tolerance: No adverse symptoms or reactions to activity. Impairments Requiring Therapeutic Intervention: none - eval with same day discharge  Prognosis: good without need for therapy intervention  Clinical Assessment: Patient presenting at independent level for completion of required mobility tasks for return to home. Eval with d/c at this time. No therapy services indicated. Safety Interventions: patient left in bed, call light within reach, patient at risk for falls, and family/caregiver present    Plan  Frequency: Eval with same day discharge. No follow up required. Current Treatment Recommendations: Not applicable, evaluation completed with same day discharge. Goals  Patient eval with same day discharge. No goals set as patient is at baseline mobility status.       Therapy Session Time      Individual Group Co-treatment   Time In     3212   Time Out     1510   Minutes     16     Timed Code Treatment Minutes:  1 Minutes  Total Treatment Minutes:  16 minutes       Electronically Signed By: Miranda Espitia, PT      Lilly Mullins, PT, DPT #[id number]

## 2023-03-03 NOTE — PROGRESS NOTES
Discharge instructions given to patient. IV removed. All belongings sent with patient. Prescriptions picked up from out patient pharmacy. Patient denies further questions.

## 2023-03-03 NOTE — PROGRESS NOTES
Pt denies needs at present. Pt calls appropriated for assistance to [facility]. Will continue to monitor.

## 2023-03-03 NOTE — PROGRESS NOTES
[address] [facility],[address] [facility] Department   Phone: [phone]    Occupational Therapy    [x] Initial Evaluation            [] Daily Treatment Note         [] Discharge Summary      Patient: Christina Mendieta   : 1955   MRN: [id number]   Date of Service:  3/3/2023    Admitting Diagnosis:  Hypotension after procedure  Current Admission Summary: Christina Mendieta is a 79 y.o. female  with PMHx of hypertension, hyperlipidemia and diabetes mellitus presented for evaluation of painful, swollen area on the back. Patient reported that she noticed swelling in the middle of her back on  and thought it might be a pimple but swelling got progressively larger, painful and started to drain thick yellowish malodorous discharge. Per patient's report, she thinks it might have been a spider/insect bite but never actually saw anything. Patient endorsed some nausea but denied any fever, chills, chest pain, shortness of breath trauma to the site, any previous hx of abscess/skin lesions, bowel or bladder dysfunction. Bedside US in the ED showed abscess pocket; underwent I&D. Patient became lightheaded and hypotensive during the procedure. BP improved with IV fluids. Patient was also noted to have new onset A-fib. Past Medical History:  has a past medical history of Arthritis, Asthma, Diabetes mellitus ([address] Hyperlipidemia, and Hypertension. Past Surgical History:  has a past surgical history that includes Achilles tendon surgery; joint replacement; Cholecystectomy (); Appendectomy (); Colonoscopy; and fracture surgery. Discharge Recommendations: Christina Mendieta scored a 24/24 on the AM-PAC ADL Inpatient form. At this time, no further OT is recommended upon discharge due to level of independence. Recommend patient returns to prior setting with prior services.        DME Required For Discharge: no DME required at discharge    Precautions/Restrictions: no restrictions  Weight Bearing Restrictions: no restrictions  [] Right Upper Extremity  [] Left Upper Extremity [] Right Lower Extremity  [] Left Lower Extremity     Required Braces/Orthotics: no braces required   [] Right  [] Left  Positional Restrictions:no positional restrictions      Pre-Admission Information     Lives With: spouse    Type of Home: house  Home Layout: two level  Home Access:  3 step to enter with handrail. Handrails are located on both sides side. Bathroom Layout: tub/shower unit, has shower seat  Toilet Height: standard height  Bathroom Equipment: shower chair, hand held shower head  Home Equipment: rolling walker, single point cane  Transfer Assistance: Independent without use of device  Ambulation Assistance:Independent without use of device  ADL Assistance: independent with all ADL's  IADL Assistance: independent with homemaking tasks  Active : [x] yes  []no  Current Employment: retired. Occupation:   Hobbies: [employer], [employer], [address]: none      Examination     Vision:   Vision Gross Assessment: WFL  Hearing:   WFL  Perception:   WFL  Observation:   General Observation:  bandage in position   Posture:   Mild kyphosis  Sensation:   WFL  Proprioception:    WFL  Tone:   Normotonic  Coordination Testing:   WFL    ROM:   (B) UE AROM WFL  Strength:   (B) UE strength grossly WFL    Decision Making: low complexity  Clinical Presentation: stable      Subjective    General: Patient in good spirits and ready to go hoome  Pain: 0/10  Pain Interventions: not applicable           Activities of Daily Living    Basic Activities of Daily Living  General Comments: patient independent  Instrumental Activities of Daily Living  No IADL completed on this date.     Functional Mobility    Bed Mobility  Supine to Sit: Independent  Comments:  Transfers  Sit to stand transfer:Independent  Comments:  Functional Mobility:  Functional Mobility Activity: independent with ambulation in room and hallway    Other Therapeutic Interventions      Functional Outcomes         Cognition  WFL  Orientation:    alert and oriented x 4  Command Following:   [facility]     Education    Barriers To Learning: none  Patient Education: patient educated on discharge recommendations  Learning Assessment:  patient verbalizes and demonstrates understanding    Assessment    Activity Tolerance: good  Impairments Requiring Therapeutic Intervention: none - eval with same day discharge  Prognosis: good  Clinical Assessment: discharge to home  Safety Interventions: call light within reach       Plan    Frequency: Eval with same day discharge. No follow up required. Current Treatment Recommendations: none    Goals    Patient Goals: home     Short Term Goals:  Time Frame: discharge  Patient eval with same day discharge. No goals set secondary to level of independence       Therapy Session Time     Individual Group Co-treatment   Time In    1500   Time Out     1515   Minutes             Timed Code Treatment Minutes:        Total Treatment Minutes:  15    Electronically Signed By: Des Buck OT

## 2023-03-03 NOTE — PLAN OF CARE
Problem: Discharge Planning  Goal: Discharge to home or other facility with appropriate resources  3/3/2023 1536 by Aleksandar Barnard RN  Outcome: Completed  3/3/2023 1535 by Aleksandar Barnard RN  Outcome: Adequate for Discharge     Problem: Pain  Goal: Verbalizes/displays adequate comfort level or baseline comfort level  3/3/2023 1536 by Aleksandar Barnard RN  Outcome: Completed  3/3/2023 1535 by Aleksandar Barnard RN  Outcome: Adequate for Discharge     Problem: Cardiovascular - Adult  Goal: Maintains optimal cardiac output and hemodynamic stability  3/3/2023 1536 by Aleksandar Barnard RN  Outcome: Completed  3/3/2023 1535 by Aleksandar Barnard RN  Outcome: Adequate for Discharge  Goal: Absence of cardiac dysrhythmias or at baseline  3/3/2023 1536 by Aleksandar Barnard RN  Outcome: Completed  3/3/2023 1535 by Aleksandar Barnard RN  Outcome: Adequate for Discharge     Problem: Skin/Tissue Integrity - Adult  Goal: Incisions, wounds, or drain sites healing without S/S of infection  3/3/2023 1536 by Aleksandar Barnard RN  Outcome: Completed  3/3/2023 1535 by Aleksandar Barnard RN  Outcome: Adequate for Discharge     Problem: Chronic Conditions and Co-morbidities  Goal: Patient's chronic conditions and co-morbidity symptoms are monitored and maintained or improved  3/3/2023 1536 by Aleksandar Barnard RN  Outcome: Completed  3/3/2023 1535 by Aleksandar Barnard RN  Outcome: Adequate for Discharge     Problem: Safety - Adult  Goal: Free from fall injury  3/3/2023 1536 by Aleksandar Barnard RN  Outcome: Completed  3/3/2023 1535 by Aleksandar Barnard RN  Outcome: Adequate for Discharge

## 2023-03-03 NOTE — PROGRESS NOTES
CLINICAL PHARMACY NOTE: MEDS TO BEDS    Total # of Prescriptions Filled: 3   The following medications were delivered to the patient:  Ferosul 325mg  Linezolid 600mg  Eliquis 5mg    Additional Documentation:     Medications were picked up in the Outpatient Pharmacy by patient    Nadine Epps

## 2023-03-03 NOTE — PROGRESS NOTES
Physician Progress Note      PATIENT:               Huong Alejandra  CSN #:                  [id number]  :                       1955  ADMIT DATE:       3/1/2023 3:24 AM  DISCH DATE:  RESPONDING  PROVIDER #:        Omar Rodriguez MD          QUERY TEXT:    Pt admitted with Back abscess and cellulitis. Pt noted to have elevated WBC,   HR, CRP. If possible, please document in the progress notes and discharge   summary if you are evaluating and /or treating any of the following: The medical record reflects the following:  Risk Factors: 78 yo w/ Back abscess and cellulitis, fatigue, hypotension. Clinical Indicators: C-Reactive Protein 31.2H, WBC 12.5H, RR 26. . Bandemia. Staphylococcal infection suspected. Per PN 3/2: Bandemia-improved. Treatment: IV bolus 1,000 mL, IV Cefepime. blood culture, CRP. Options provided:  -- Sepsis, present on admission  -- Cellulitis without Sepsis  -- Other - I will add my own diagnosis  -- Disagree - Not applicable / Not valid  -- Disagree - Clinically unable to determine / Unknown  -- Refer to Clinical Documentation Reviewer    PROVIDER RESPONSE TEXT:    This patient has cellulitis without Sepsis.     Query created by: Neldon Apley on 3/3/2023 8:17 AM      Electronically signed by:  Omar Rodriguez MD 3/3/2023 11:30 AM

## 2023-03-03 NOTE — PROGRESS NOTES
Reassessment documented. No changes noted in care. PIV leaking. New IV placed right wrist. Pt tolerated well. Denies other needs or concerns at present. Will continue to monitor.

## 2023-03-03 NOTE — DISCHARGE SUMMARY
Hospital Medicine Discharge Summary    Patient ID: Zina Cruz      Patient's PCP: Sharon Campbell    Admit Date: 3/1/2023     Discharge Date: 3/3/2023     Admitting Physician: Destiny Lugo MD     Discharge Physician: Zeenat Jerez MD        Active Hospital Problems    Diagnosis     Diabetes education, encounter for [Z71.89]      Priority: Medium    Hypotension after procedure [I95.81]      Priority: Medium    Back abscess [L02.212]      Priority: Medium    Cellulitis of back [L03.312]      Priority: Medium    Near syncope [R55]      Priority: Medium    New onset atrial fibrillation ([address] [I48.91]      Priority: Medium    Bandemia [D72.825]      Priority: Medium    Morbid obesity due to excess calories ([address] [E66.01]      Priority: Medium    Mixed hyperlipidemia [E78.2]      Priority: Medium         Hospital Course: This 79 y.o. female  with PMHx of hypertension, hyperlipidemia and diabetes mellitus presented for evaluation of painful, swollen area on the back. Bedside US in the ED showed abscess pocket; underwent I&D in the ED. Patient became lightheaded and hypotensive during the procedure. BP improved with IV fluids. Patient was also noted to have new onset A-fib      Back abscess: General surgery consulted; underwent bedside I&D on 1/3/2023  Patient remained afebrile, WBCs trended down. MRSA nares negative. Blood culture NGTD  Infectious disease consulted and per the recs, patient was treated IV antibiotic during hospital stay and discharged on Zyvox. New onset A-fib: Rate controlled  Denies any chest pain, palpitation or previous history of A-fib. EKG with A-fib on admission. Troponin negative  Echo showed EF of 55 to 60%. Cardiology consulted and further recs patient was started on on Eliquis given [id number] score of 4. Patient was instructed to follow with [facility] cardiology on make appointment with [place] cardiology for possible cardioversion     Hypertension: Stable.   Continue current regimen. Hyperlipidemia: Continue statins     Diabetes mellitus; hemoglobin A1c 6.4 on admission. Continue current regimen       Physical Exam Performed:     /71   Pulse (!) 108   Temp 98 °F (36.7 °C) (Oral)   Resp 16   Ht 5' 6\" (1.676 m)   Wt 272 lb 8 oz (123.6 kg)   SpO2 96%   BMI 43.98 kg/m²     General appearance: No apparent distress, appears stated age and cooperative. Eyes: Sclera clear. Pupils equal.  ENT: Moist oral mucosa. Trachea midline, no adenopathy. Cardiovascular: Irregular rhythm, normal S1, S2. No murmur. Respiratory: Clear to auscultation bilaterally, no wheeze or crackles. GI: Abdomen soft, no tenderness, not distended, normal bowel sounds  Musculoskeletal:  No cyanosis in digits. No BLE edema present  Neurology: CN 2-12 grossly intact. No speech or motor deficits  Psych: Not agitated, appropriate affect. Skin: Dressing on the back. Erythema and induration on the back improved    Consults:     IP CONSULT TO HOSPITALIST  IP CONSULT TO GENERAL SURGERY  IP CONSULT TO CARDIOLOGY  IP CONSULT TO INFECTIOUS DISEASES  IP CONSULT TO FINANCIAL COUNSELOR    Disposition: Home    Condition at Discharge: Stable     Discharge Instructions/Follow-up:   Follow up with your PCP within 7-10 days of discharge. Make appointment with [place] electrophysiology or follow-up with [facility] cardiology as instructed; outpatient cardioversion for A-fib recommended  Follow up with general surgery as instructed   Follow up with infectious disease as instructed   Take all your medications as prescribed.          Discharge Medications:     Discharge Medication List as of 3/3/2023  3:41 PM             Details   apixaban (ELIQUIS) 5 MG TABS tablet Take 1 tablet by mouth 2 times daily, Disp-60 tablet, R-0Normal      ferrous sulfate (IRON 325) 325 (65 Fe) MG tablet Take 1 tablet by mouth 2 times daily (with meals), Disp-30 tablet, R-0Normal      linezolid (ZYVOX) 600 MG tablet Take 1 tablet by mouth every 12 hours for 10 days, Disp-20 tablet, R-0Normal      HYDROcodone-acetaminophen (NORCO) 5-325 MG per tablet Take 1 tablet by mouth every 6 hours as needed for Pain (CAUTION: Can cause dizziness, don't drive while taking.) for up to 4 days. Max Daily Amount: 4 tablets, Disp-16 tablet, R-0Normal                Details   acetaminophen (TYLENOL) 500 MG tablet Take 1,000 mg by mouth every 6 hours as needed for PainHistorical Med      Calcium Carbonate-Vitamin D (OYSTER SHELL CALCIUM/D) 500-200 MG-UNIT TABS TAKE 1 TABLET BY MOUTH TWICE A DAYHistorical Med      metFORMIN (GLUCOPHAGE) 500 MG tablet TAKE ONE TABLET BY MOUTH TWICE A DAY WITH FOOD FOR DIABETESHistorical Med      vitamin D (CHOLECALCIFEROL) 25 MCG (1000 UT) TABS tablet 1,000 Units in the morning and at bedtimeHistorical Med      atorvastatin (LIPITOR) 80 MG tablet TAKE ONE TABLET BY MOUTH AT BEDTIME FOR CHOLESTEROLHistorical Med      lisinopril-hydrochlorothiazide (PRINZIDE;ZESTORETIC) 20-25 MG per tablet Take 1 tablet by mouth 2 times dailyHistorical Med           The patient was seen and examined on day of discharge and this discharge summary is in conjunction with any daily progress note from day of discharge. Time Spent on discharge is 45 minutes  in the examination, evaluation, counseling and review of medications and discharge plan. Note that greater  than 30 minutes was spent in preparing discharge papers, discussing discharge with patient, medication review, etc.     Signed:    Ashlyn Gold MD   3/3/2023      Thank you Kenneth Saunders for the opportunity to be involved in this patient's care. If you have any questions or concerns please feel free to contact me at [phone].

## 2023-03-03 NOTE — PROGRESS NOTES
Infectious Diseases   Progress Note      Admission Date: 3/1/2023  Hospital Day: Hospital Day: 3   Attending: An Irwin MD  Date of service: 3/3/2023     Chief complaint/ Reason for consult:     Worsening back abscess  Bandemia  Type 2 diabetes mellitus  Atrial fibrillation  Essential hypertension    Microbiology:        I have reviewed allavailable micro lab data and cultures    Blood culture (2/2) - collected on 3/1/2023: in process      Antibiotics and immunizations:       Current antibiotics: All antibiotics and their doses were reviewed by me    Recent Abx Admin                     cefepime (MAXIPIME) 2,000 mg in sodium chloride 0.9 % 50 mL IVPB (mini-bag) (mg) 2,000 mg New Bag 03/03/23 0847     2,000 mg New Bag 03/02/23 2036    linezolid (ZYVOX) tablet 600 mg (mg) 600 mg Given 03/03/23 0847     600 mg Given 03/02/23 2031                      Immunization History: All immunization history was reviewed by me today. There is no immunization history on file for this patient. Known drug allergies: All allergies were reviewed and updated    No Known Allergies    Social history:     Social History:  All social andepidemiologic history was reviewed and updated by me today as needed. Tobacco use:   reports that she has never smoked. She does not have any smokeless tobacco history on file. Alcohol use:   reports no history of alcohol use. Currently lives in: [address] [place] [address]   reports no history of drug use. COVID VACCINATION AND LAB RESULT RECORDS:     Internal Administration   First Dose      Second Dose           Last COVID Lab No results found for: SARS-COV-2, SARS-COV-2 RNA, SARS-COV-2, SARS-COV-2, SARS-COV-2 BY PCR, SARS-COV-2, SARS-COV-2, SARS-COV-2         Assessment:     The patient is a 79 y.o. old female who  has a past medical history of Arthritis (2009), Asthma (2001), Diabetes mellitus ([address] (2005), Hyperlipidemia, and Hypertension.  with following problems:    Worsening back abscess-s/p bedside I&D on 3/1/2023 with general surgery-currently on empiric linezolid and cefepime  Bandemia-resolved  Type 2 diabetes mellitus-maintain good glycemic control  Atrial fibrillation  Essential hypertension-blood pressure okay  Mixed hyperlipidemia  Obesity Class 3 due to excess calorie intake : Body mass index is 44.52 kg/m².-Counseling done      Discussion:      She she is afebrile. He is tolerating antibiotics okay. He is on linezolid and IV cefepime. Nasal MRSA culture from 3/1/2023 was negative. Serum creatinine is 1.0. Platelet count is 357,155. The patient had a 2D echo done yesterday which showed ejection fraction of 55 to 60%      Plan:     Diagnostic Workup:      Continue to follow  fever curve, WBC count and blood cultures. Continue to monitor blood counts, liver and renal function. I am not sure the wound culture has been sent. We will order on wound culture again today    Antimicrobials: Will continue oral linezolid 600 mg every 12 hours. Platelet count is 835,904. We will plan to stop IV cefepime at discharge  We will follow up on the culture results and clinical progress and will make further recommendations accordingly. If the patient gets discharged today, plan for 10-day course of oral linezolid at discharge. I have reconciled her discharge antibiotics  Continue close vitals monitoring. Maintain good glycemic control. Fall precautions. Aspiration precautions. Continue to watch for new fever or diarrhea. DVT prophylaxis. Discussed all above with patient and RN. Discussed with Dr. Loreta Salter      Drug Monitoring:    Continue monitoring for antibiotic toxicity as follows: CBC, CMP   Continue to watch for following: new or worsening fever, new hypotension, hives, lip swelling and redness or purulence at vascular access sites. I/v access Management:    Continue to monitor i.v access sites for erythema, induration, discharge or tenderness.    As always, continue efforts to minimize tubes/lines/drains as clinically appropriate to reduce chances of line associated infections. Patient education and counseling: The patient was educated in detail about the side-effects of various antibiotics and things to watch for like new rashes, lip swelling, severe reaction, worsening diarrhea, break through fever etc.  Discussed patient's condition and what to expect. All of the patient's questions were addressed in a satisfactory manner and patient verbalized understanding all instructions. Level of complexity of visit and medical decision making: High     Diabetes mellitus education and counseling:    Patient was educated in detail about the importance of keeping diabetes under control to improve the cure rate of infection and prevent future infections. Patient was advised to check blood glucose level regularly and to stay compliant with the diabetes medications. Patient was advised to the trim the toe nails carefully, wear shoes or slippers at all times and check both feet everyday before going to bed to look for any cuts, blisters, swelling or redness. Importance of washing the feet everyday with soap and water and keeping them dry, and seeking prompt medical attention in case of a non-healing wound or ulcer on the feet was also highlighted. TIME SPENT TODAY:     - Spent over  36 minutes on visit (including interval history, physical exam, review of data including labs, cultures, imaging, development and implementation of treatment plan and coordination of complex care). More than 50 percent of this includes face-to-face time spent with the patient for counseling and coordination of care. Thank you for involving me in the care of your patient. I will continue to follow. If you have anyadditional questions, please do not hesitate to contact me. Subjective: Interval history: Interval history was obtained from chart review and patient/ RN. She is afebrile. She is tolerating antibiotics okay. No diarrhea. REVIEW OF SYSTEMS:      Review of Systems   Constitutional:  Negative for chills, diaphoresis and fever. HENT:  Negative for ear discharge, ear pain, postnasal drip, rhinorrhea, sinus pressure, sinus pain and sore throat. Eyes:  Negative for discharge and redness. Respiratory:  Negative for cough, shortness of breath and wheezing. Cardiovascular:  Negative for chest pain and leg swelling. Gastrointestinal:  Negative for abdominal pain, constipation, diarrhea and nausea. Endocrine: Negative for cold intolerance, heat intolerance and polydipsia. Genitourinary:  Negative for dysuria, flank pain, frequency, hematuria and urgency. Musculoskeletal:  Negative for back pain and myalgias. Skin:  Negative for rash. Allergic/Immunologic: Negative for immunocompromised state. Neurological:  Negative for dizziness, seizures and headaches. Hematological:  Does not bruise/bleed easily. Psychiatric/Behavioral:  Negative for agitation, hallucinations and suicidal ideas. The patient is not nervous/anxious. All other systems reviewed and are negative. Past Medical History: All past medical history reviewed today. Past Medical History:   Diagnosis Date    Arthritis 2009    Asthma 2001    Diabetes mellitus ([facility] [address] 2005    Hyperlipidemia     Hypertension        Past Surgical History: All past surgical history was reviewed today. Past Surgical History:   Procedure Laterality Date    ACHILLES TENDON SURGERY      APPENDECTOMY  1978    CHOLECYSTECTOMY  1978    COLONOSCOPY      FRACTURE SURGERY      JOINT REPLACEMENT      bilateral partial knee       Family History: All family history was reviewed today. History reviewed. No pertinent family history.     Objective:       PHYSICAL EXAM:      Vitals:   Vitals:    03/02/23 2027 03/03/23 0026 03/03/23 0444 03/03/23 0830   BP: 120/79 114/69 119/77 139/71   Pulse: 83 80 80 (!) 108 Resp: 18 18 16 16   Temp: 97.5 °F (36.4 °C) 97.9 °F (36.6 °C) 97.8 °F (36.6 °C) 98 °F (36.7 °C)   TempSrc: Oral Oral Oral Oral   SpO2: 93% 96% 95% 96%   Weight:   272 lb 8 oz (123.6 kg)    Height:           Physical Exam  Vitals and nursing note reviewed. Constitutional:       Appearance: She is well-developed. She is not diaphoretic. Comments: The patient was seen earlier today. HENT:      Head: Normocephalic and atraumatic. Right Ear: External ear normal. There is no impacted cerumen. Left Ear: External ear normal. There is no impacted cerumen. Nose: Nose normal.      Mouth/Throat:      Mouth: Mucous membranes are moist.      Pharynx: Oropharynx is clear. No oropharyngeal exudate. Eyes:      General: No scleral icterus. Right eye: No discharge. Left eye: No discharge. Conjunctiva/sclera: Conjunctivae normal.      Pupils: Pupils are equal, round, and reactive to light. Neck:      Thyroid: No thyromegaly. Cardiovascular:      Rate and Rhythm: Normal rate and regular rhythm. Heart sounds: Normal heart sounds. No murmur heard. No friction rub. Pulmonary:      Effort: No respiratory distress. Breath sounds: No stridor. No wheezing or rales. Abdominal:      General: Bowel sounds are normal.      Palpations: Abdomen is soft. Tenderness: There is no abdominal tenderness. There is no guarding or rebound. Musculoskeletal:         General: No swelling, tenderness or deformity. Normal range of motion. Cervical back: Normal range of motion and neck supple. Right lower leg: No edema. Left lower leg: No edema. Lymphadenopathy:      Cervical: No cervical adenopathy. Skin:     General: Skin is warm and dry. Coloration: Skin is not jaundiced. Findings: Erythema present. No bruising or rash. Comments: Back abscess site is slowly improving   Neurological:      General: No focal deficit present.       Mental Status: She is alert and oriented to person, place, and time. Mental status is at baseline. Motor: No abnormal muscle tone. Psychiatric:         Mood and Affect: Mood normal.         Behavior: Behavior normal.    *    Lines and drains: All vascular access sites are healthy with no local erythema, discharge or tenderness. Intake and output:    I/O last 3 completed shifts: In: 240 [P.O.:240]  Out: 65 [Urine:1700]    Lab Data:   All available labs and old records have been reviewed by me. CBC:  Recent Labs     03/01/23 0439 03/02/23 0441 03/03/23  0508   WBC 12.5* 8.1 7.6   RBC 4.04 4.05 4.18   HGB 11.6* 11.8* 11.8*   HCT 35.4* 35.2* 36.4    215 223   MCV 87.5 86.7 87.2   MCH 28.8 29.0 28.2   MCHC 32.9 33.4 32.3   RDW 15.7* 15.5* 15.7*          BMP:  Recent Labs     03/01/23 0439 03/02/23 0441 03/03/23  0508    138 138   K 4.5 4.2 4.4    104 102   CO2 25 25 27   BUN 30* 18 17   CREATININE 1.0 0.8 1.0   CALCIUM 10.0 9.0 9.1   GLUCOSE 169* 123* 105*          Hepatic Function Panel:   Lab Results   Component Value Date/Time    ALKPHOS 98 03/01/2023 04:39 AM    ALT 10 03/01/2023 04:39 AM    AST 13 03/01/2023 04:39 AM    PROT 7.3 03/01/2023 04:39 AM    BILITOT 0.6 03/01/2023 04:39 AM    LABALBU 3.8 03/01/2023 04:39 AM       CPK: No results found for: CKTOTAL  ESR:   Lab Results   Component Value Date    SEDRATE 53 (H) 03/01/2023     CRP:   Lab Results   Component Value Date    CRP 31.2 (H) 03/01/2023           Imaging: All pertinent images and reports for the current visit were reviewed by me during this visit. I reviewed the chest x-ray/CT scan/MRI images and independently interpreted the findings and results today. XR CHEST PORTABLE   Final Result   1. No acute cardiopulmonary disease. Medications: All current and past medications were reviewed.      ferrous sulfate  325 mg Oral BID WC    atorvastatin  40 mg Oral Nightly    sodium chloride flush  5-40 mL IntraVENous 2 times per day    insulin lispro  0-4 Units SubCUTAneous TID WC    insulin lispro  0-4 Units SubCUTAneous Nightly    lisinopril  20 mg Oral Daily    apixaban  5 mg Oral BID    linezolid  600 mg Oral 2 times per day    cefepime  2,000 mg IntraVENous Q12H        sodium chloride      dextrose         sodium chloride flush, sodium chloride, ondansetron **OR** ondansetron, polyethylene glycol, acetaminophen **OR** acetaminophen, dextrose bolus **OR** dextrose bolus, glucagon (rDNA), dextrose      Problem list:       Patient Active Problem List   Diagnosis Code    Hypotension after procedure I95.81    Back abscess L02.212    Cellulitis of back L03.312    Near syncope R55    New onset atrial fibrillation ([facility] [address] I48.91    Bandemia D72.825    Morbid obesity due to excess calories ([facility] [address] E66.01    Mixed hyperlipidemia E78.2    Diabetes education, encounter for Z71.89       Please note that this chart was generated using Dragon dictation software. Although every effort was made to ensure the accuracy of this automated transcription, some errors in transcription may have occurred inadvertently. If you may need any clarification, please do not hesitate to contact me through EPIC or at the phone number provided below with my electronic signature. Any pictures or media included in this note were obtained after taking informed verbal consent from the patient and with their approval to include those in the patient's medical record. Mikal Morrell MD, MPH, FACP, [facility]  3/3/2023, 10:45 AM  [facility] Infectious Disease   [address] [place] [address]., [address] [facility], [address] [place] [address]  Office: [phone]  Fax: [phone]  In-person Clinic days:  Tuesday & Thursday a.m. Virtual clinic days: Monday, Wednesday & Friday a.m.

## 2023-03-03 NOTE — PROGRESS NOTES
Shift assessment completed, see flowsheets. Medications administered, see MAR. Plan of care discussed with patient. No complaints of pain or discomfort. Dressing changed to back wound, very minimal drainage noted on bandage. Wound culture collected and sent to lab. Call light and bedside table within reach. Safety precautions in place. Pt denies further needs at this time. Will continue to monitor.   Naya Huitron RN

## 2023-03-04 NOTE — PROGRESS NOTES
Physician Progress Note      PATIENT:               Risa Dean  CSN #:                  [id number]  :                       1955  ADMIT DATE:       3/1/2023 3:24 AM  DISCH DATE:        3/3/2023 4:20 PM  RESPONDING  PROVIDER #:        Teresa Coffey MD          QUERY TEXT:    Patient admitted with back abscess and cellulitis. Per procedure note dated   3/1 documentation of I&D. To accurately reflect the procedure performed please further specify the depth   of tissue incised and drained: The medical record reflects the following:  Risk Factors: 78 yo w/ back abscess and cellulitis  Clinical Indicators: The skin and subcutaneous tissues were infiltrated with   1% lidocaine. A 15 mm cruciate incision was made over the maximum point of   fluctuance. Purulent fluid was expressed. Hemostasis was achieved. Treatment: As above  Options provided:  -- Skin only  -- Subcutaneous tissue  -- Other - I will add my own diagnosis  -- Disagree - Not applicable / Not valid  -- Disagree - Clinically unable to determine / Unknown  -- Refer to Clinical Documentation Reviewer    PROVIDER RESPONSE TEXT:    Addendum to 3/1 procedure note The depth of the drainage to back sebaceous   cyst was down to and including subcutaneous tissue.     Query created by: Pippa Bauman on 3/3/2023 8:13 AM      Electronically signed by:  Teresa Coffey MD 3/4/2023 11:06 AM

## 2023-03-04 NOTE — PROGRESS NOTES
Physician Progress Note      PATIENT:               Steph Jennings  CSN #:                  [id number]  :                       1955  ADMIT DATE:       3/1/2023 3:24 AM  DISCH DATE:        3/3/2023 4:20 PM  RESPONDING  PROVIDER #:        Yolanda Moss MD          QUERY TEXT:    Patient admitted with back abscess. Per ED note dated 3/1 documentation of   I&D. To accurately reflect the procedure performed please further specify the depth   of tissue incised and drained: The medical record reflects the following:  Risk Factors: 80 yo w/ back abscess  Clinical Indicators: Per ED note:  Using a scalpel, an incision was then made   over the greatest area of fluctuance and approximately 5 cc of thick, foul   smelling, and purulent material was expressed. Loculations were broken up   using a hemostat and more of the material was able to be expressed. The   drainage cavity was then irrigated, packed with sterile gauze, and dressed   with a bandage. Treatment: As above  Options provided:  -- Skin only  -- Subcutaneous tissue  -- Fascia  -- Other - I will add my own diagnosis  -- Disagree - Not applicable / Not valid  -- Disagree - Clinically unable to determine / Unknown  -- Refer to Clinical Documentation Reviewer    PROVIDER RESPONSE TEXT:    Addendum to 3/1 procedure note: This depth of the drainage to upper back   abscess was skin only.     Query created by: Amelia Richards on 3/3/2023 8:16 AM      Electronically signed by:  Yolanda Moss MD 3/4/2023 7:03 AM

## 2023-03-05 LAB
BLOOD CULTURE, ROUTINE: NORMAL
CULTURE, BLOOD 2: NORMAL
GRAM STAIN RESULT: NORMAL
WOUND/ABSCESS: NORMAL

## 2023-03-06 LAB
GRAM STAIN RESULT: NORMAL
WOUND/ABSCESS: NORMAL

## 2024-07-17 ENCOUNTER — TRANSCRIBE ORDERS (OUTPATIENT)
Dept: ADMINISTRATIVE | Age: 69
End: 2024-07-17

## 2024-07-17 DIAGNOSIS — Z12.31 ENCOUNTER FOR SCREENING MAMMOGRAM FOR MALIGNANT NEOPLASM OF BREAST: Primary | ICD-10-CM

## 2024-07-19 ENCOUNTER — HOSPITAL ENCOUNTER (OUTPATIENT)
Dept: WOMENS IMAGING | Age: 69
Discharge: HOME | End: 2024-07-19
Payer: MEDICARE

## 2024-07-19 VITALS — HEIGHT: 66 IN | BODY MASS INDEX: 46.28 KG/M2 | WEIGHT: 288 LBS

## 2024-07-19 DIAGNOSIS — Z12.31 ENCOUNTER FOR SCREENING MAMMOGRAM FOR MALIGNANT NEOPLASM OF BREAST: ICD-10-CM

## 2024-07-19 PROCEDURE — 77063 BREAST TOMOSYNTHESIS BI: CPT

## 2025-08-13 ENCOUNTER — TRANSCRIBE ORDERS (OUTPATIENT)
Dept: ADMINISTRATIVE | Age: 70
End: 2025-08-13

## 2025-08-13 DIAGNOSIS — Z12.31 ENCOUNTER FOR SCREENING MAMMOGRAM FOR MALIGNANT NEOPLASM OF BREAST: Primary | ICD-10-CM
